# Patient Record
Sex: FEMALE | Race: WHITE | HISPANIC OR LATINO | Employment: FULL TIME | URBAN - METROPOLITAN AREA
[De-identification: names, ages, dates, MRNs, and addresses within clinical notes are randomized per-mention and may not be internally consistent; named-entity substitution may affect disease eponyms.]

---

## 2022-07-31 ENCOUNTER — APPOINTMENT (OUTPATIENT)
Dept: PREADMISSION TESTING | Facility: HOSPITAL | Age: 22
End: 2022-07-31

## 2022-08-01 ENCOUNTER — PRE-ADMISSION TESTING (OUTPATIENT)
Dept: PREADMISSION TESTING | Facility: HOSPITAL | Age: 22
End: 2022-08-01

## 2022-08-01 ENCOUNTER — ANESTHESIA EVENT (OUTPATIENT)
Dept: PERIOP | Facility: HOSPITAL | Age: 22
End: 2022-08-01

## 2022-08-01 LAB
FLUAV SUBTYP SPEC NAA+PROBE: NOT DETECTED
FLUBV RNA ISLT QL NAA+PROBE: NOT DETECTED
SARS-COV-2 RNA PNL SPEC NAA+PROBE: NOT DETECTED

## 2022-08-01 PROCEDURE — C9803 HOPD COVID-19 SPEC COLLECT: HCPCS

## 2022-08-01 PROCEDURE — 87636 SARSCOV2 & INF A&B AMP PRB: CPT

## 2022-08-01 RX ORDER — FAMOTIDINE 10 MG/ML
20 INJECTION, SOLUTION INTRAVENOUS ONCE
Status: CANCELLED | OUTPATIENT
Start: 2022-08-01 | End: 2022-08-01

## 2022-08-02 ENCOUNTER — ANESTHESIA EVENT CONVERTED (OUTPATIENT)
Dept: ANESTHESIOLOGY | Facility: HOSPITAL | Age: 22
End: 2022-08-02

## 2022-08-02 ENCOUNTER — ANESTHESIA (OUTPATIENT)
Dept: PERIOP | Facility: HOSPITAL | Age: 22
End: 2022-08-02

## 2022-08-02 ENCOUNTER — HOSPITAL ENCOUNTER (OUTPATIENT)
Facility: HOSPITAL | Age: 22
Discharge: HOME | End: 2022-08-03
Attending: ORTHOPAEDIC SURGERY | Admitting: ORTHOPAEDIC SURGERY

## 2022-08-02 DIAGNOSIS — M24.111 SNAPPING SCAPULA SYNDROME OF RIGHT SHOULDER: Primary | ICD-10-CM

## 2022-08-02 LAB
B-HCG UR QL: NEGATIVE
EXPIRATION DATE: NORMAL
INTERNAL NEGATIVE CONTROL: NEGATIVE
INTERNAL POSITIVE CONTROL: POSITIVE
Lab: NORMAL

## 2022-08-02 PROCEDURE — 25010000002 DROPERIDOL PER 5 MG

## 2022-08-02 PROCEDURE — 25010000002 FENTANYL CITRATE (PF) 50 MCG/ML SOLUTION: Performed by: ANESTHESIOLOGY

## 2022-08-02 PROCEDURE — 25010000002 DEXAMETHASONE SODIUM PHOSPHATE 10 MG/ML SOLUTION: Performed by: ORTHOPAEDIC SURGERY

## 2022-08-02 PROCEDURE — 25010000002 EPINEPHRINE PER 0.1 MG: Performed by: ORTHOPAEDIC SURGERY

## 2022-08-02 PROCEDURE — 25010000002 HYDROMORPHONE PER 4 MG: Performed by: ORTHOPAEDIC SURGERY

## 2022-08-02 PROCEDURE — 25010000002 PROCHLORPERAZINE 10 MG/2ML SOLUTION: Performed by: ORTHOPAEDIC SURGERY

## 2022-08-02 PROCEDURE — 25010000002 MIDAZOLAM PER 1 MG: Performed by: ORTHOPAEDIC SURGERY

## 2022-08-02 PROCEDURE — 25010000002 ONDANSETRON PER 1 MG

## 2022-08-02 PROCEDURE — 25010000002 ONDANSETRON PER 1 MG: Performed by: ORTHOPAEDIC SURGERY

## 2022-08-02 PROCEDURE — 25010000002 ONDANSETRON PER 1 MG: Performed by: NURSE ANESTHETIST, CERTIFIED REGISTERED

## 2022-08-02 PROCEDURE — 81025 URINE PREGNANCY TEST: CPT | Performed by: ANESTHESIOLOGY

## 2022-08-02 PROCEDURE — 25010000002 FENTANYL CITRATE (PF) 50 MCG/ML SOLUTION

## 2022-08-02 PROCEDURE — 76942 ECHO GUIDE FOR BIOPSY: CPT | Performed by: ORTHOPAEDIC SURGERY

## 2022-08-02 PROCEDURE — 0 MEPERIDINE PER 100 MG

## 2022-08-02 PROCEDURE — 25010000002 PROPOFOL 10 MG/ML EMULSION: Performed by: NURSE ANESTHETIST, CERTIFIED REGISTERED

## 2022-08-02 PROCEDURE — 25010000002 BUPRENORPHINE PER 0.1 MG: Performed by: ORTHOPAEDIC SURGERY

## 2022-08-02 PROCEDURE — 63710000001 PROMETHAZINE PER 12.5 MG: Performed by: ORTHOPAEDIC SURGERY

## 2022-08-02 PROCEDURE — 25010000002 FENTANYL CITRATE (PF) 50 MCG/ML SOLUTION: Performed by: ORTHOPAEDIC SURGERY

## 2022-08-02 RX ORDER — LABETALOL HYDROCHLORIDE 5 MG/ML
5 INJECTION, SOLUTION INTRAVENOUS
Status: DISCONTINUED | OUTPATIENT
Start: 2022-08-02 | End: 2022-08-02 | Stop reason: HOSPADM

## 2022-08-02 RX ORDER — ACETAMINOPHEN 325 MG/1
650 TABLET ORAL ONCE AS NEEDED
Status: DISCONTINUED | OUTPATIENT
Start: 2022-08-02 | End: 2022-08-02 | Stop reason: HOSPADM

## 2022-08-02 RX ORDER — MIDAZOLAM HYDROCHLORIDE 1 MG/ML
INJECTION INTRAMUSCULAR; INTRAVENOUS
Status: COMPLETED | OUTPATIENT
Start: 2022-08-02 | End: 2022-08-02

## 2022-08-02 RX ORDER — SODIUM CHLORIDE 0.9 % (FLUSH) 0.9 %
10 SYRINGE (ML) INJECTION EVERY 12 HOURS SCHEDULED
Status: DISCONTINUED | OUTPATIENT
Start: 2022-08-02 | End: 2022-08-02 | Stop reason: HOSPADM

## 2022-08-02 RX ORDER — CEFAZOLIN SODIUM IN 0.9 % NACL 2 G/100 ML
2 PLASTIC BAG, INJECTION (ML) INTRAVENOUS ONCE
Status: COMPLETED | OUTPATIENT
Start: 2022-08-02 | End: 2022-08-02

## 2022-08-02 RX ORDER — EPINEPHRINE 1 MG/ML
INJECTION, SOLUTION, CONCENTRATE INTRAVENOUS AS NEEDED
Status: DISCONTINUED | OUTPATIENT
Start: 2022-08-02 | End: 2022-08-02 | Stop reason: HOSPADM

## 2022-08-02 RX ORDER — ALBUTEROL SULFATE 2.5 MG/3ML
2.5 SOLUTION RESPIRATORY (INHALATION) EVERY 4 HOURS PRN
Status: DISCONTINUED | OUTPATIENT
Start: 2022-08-02 | End: 2022-08-03 | Stop reason: HOSPADM

## 2022-08-02 RX ORDER — ROCURONIUM BROMIDE 10 MG/ML
INJECTION, SOLUTION INTRAVENOUS AS NEEDED
Status: DISCONTINUED | OUTPATIENT
Start: 2022-08-02 | End: 2022-08-02 | Stop reason: SURG

## 2022-08-02 RX ORDER — BUPIVACAINE HCL/0.9 % NACL/PF 0.125 %
PLASTIC BAG, INJECTION (ML) EPIDURAL AS NEEDED
Status: DISCONTINUED | OUTPATIENT
Start: 2022-08-02 | End: 2022-08-02 | Stop reason: SURG

## 2022-08-02 RX ORDER — DROPERIDOL 2.5 MG/ML
INJECTION, SOLUTION INTRAMUSCULAR; INTRAVENOUS
Status: COMPLETED
Start: 2022-08-02 | End: 2022-08-02

## 2022-08-02 RX ORDER — ONDANSETRON 2 MG/ML
INJECTION INTRAMUSCULAR; INTRAVENOUS
Status: COMPLETED
Start: 2022-08-02 | End: 2022-08-02

## 2022-08-02 RX ORDER — FENTANYL CITRATE 50 UG/ML
50 INJECTION, SOLUTION INTRAMUSCULAR; INTRAVENOUS
Status: DISCONTINUED | OUTPATIENT
Start: 2022-08-02 | End: 2022-08-02 | Stop reason: HOSPADM

## 2022-08-02 RX ORDER — DEXAMETHASONE SODIUM PHOSPHATE 10 MG/ML
INJECTION, SOLUTION INTRAMUSCULAR; INTRAVENOUS
Status: COMPLETED | OUTPATIENT
Start: 2022-08-02 | End: 2022-08-02

## 2022-08-02 RX ORDER — ONDANSETRON 4 MG/1
4 TABLET, FILM COATED ORAL EVERY 8 HOURS PRN
Qty: 10 TABLET | Refills: 0 | Status: SHIPPED | OUTPATIENT
Start: 2022-08-02

## 2022-08-02 RX ORDER — ONDANSETRON 2 MG/ML
INJECTION INTRAMUSCULAR; INTRAVENOUS AS NEEDED
Status: DISCONTINUED | OUTPATIENT
Start: 2022-08-02 | End: 2022-08-02 | Stop reason: SURG

## 2022-08-02 RX ORDER — MELOXICAM 7.5 MG/1
15 TABLET ORAL DAILY
Status: DISCONTINUED | OUTPATIENT
Start: 2022-08-02 | End: 2022-08-03 | Stop reason: HOSPADM

## 2022-08-02 RX ORDER — BUPRENORPHINE HYDROCHLORIDE 0.32 MG/ML
INJECTION INTRAMUSCULAR; INTRAVENOUS
Status: COMPLETED | OUTPATIENT
Start: 2022-08-02 | End: 2022-08-02

## 2022-08-02 RX ORDER — PROCHLORPERAZINE EDISYLATE 5 MG/ML
5 INJECTION INTRAMUSCULAR; INTRAVENOUS EVERY 6 HOURS PRN
Status: DISCONTINUED | OUTPATIENT
Start: 2022-08-02 | End: 2022-08-03 | Stop reason: HOSPADM

## 2022-08-02 RX ORDER — DOCUSATE SODIUM 100 MG/1
100 CAPSULE, LIQUID FILLED ORAL 2 TIMES DAILY
Status: DISCONTINUED | OUTPATIENT
Start: 2022-08-02 | End: 2022-08-03 | Stop reason: HOSPADM

## 2022-08-02 RX ORDER — MEPERIDINE HYDROCHLORIDE 25 MG/ML
INJECTION INTRAMUSCULAR; INTRAVENOUS; SUBCUTANEOUS
Status: COMPLETED
Start: 2022-08-02 | End: 2022-08-02

## 2022-08-02 RX ORDER — ONDANSETRON 2 MG/ML
4 INJECTION INTRAMUSCULAR; INTRAVENOUS ONCE AS NEEDED
Status: COMPLETED | OUTPATIENT
Start: 2022-08-02 | End: 2022-08-02

## 2022-08-02 RX ORDER — ALBUTEROL SULFATE 90 UG/1
2 AEROSOL, METERED RESPIRATORY (INHALATION) EVERY 4 HOURS PRN
COMMUNITY

## 2022-08-02 RX ORDER — HYDROMORPHONE HYDROCHLORIDE 1 MG/ML
0.5 INJECTION, SOLUTION INTRAMUSCULAR; INTRAVENOUS; SUBCUTANEOUS
Status: DISCONTINUED | OUTPATIENT
Start: 2022-08-02 | End: 2022-08-02 | Stop reason: HOSPADM

## 2022-08-02 RX ORDER — ACETAMINOPHEN 500 MG
1000 TABLET ORAL EVERY 6 HOURS PRN
COMMUNITY
End: 2022-08-03 | Stop reason: HOSPADM

## 2022-08-02 RX ORDER — LIDOCAINE HYDROCHLORIDE 10 MG/ML
INJECTION, SOLUTION EPIDURAL; INFILTRATION; INTRACAUDAL; PERINEURAL AS NEEDED
Status: DISCONTINUED | OUTPATIENT
Start: 2022-08-02 | End: 2022-08-02 | Stop reason: SURG

## 2022-08-02 RX ORDER — ESCITALOPRAM OXALATE 10 MG/1
10 TABLET ORAL DAILY
COMMUNITY

## 2022-08-02 RX ORDER — PROPOFOL 10 MG/ML
VIAL (ML) INTRAVENOUS AS NEEDED
Status: DISCONTINUED | OUTPATIENT
Start: 2022-08-02 | End: 2022-08-02 | Stop reason: SURG

## 2022-08-02 RX ORDER — HYDROCODONE BITARTRATE AND ACETAMINOPHEN 5; 325 MG/1; MG/1
1-2 TABLET ORAL EVERY 4 HOURS PRN
Qty: 15 TABLET | Refills: 0 | Status: SHIPPED | OUTPATIENT
Start: 2022-08-02

## 2022-08-02 RX ORDER — SODIUM CHLORIDE, SODIUM LACTATE, POTASSIUM CHLORIDE, CALCIUM CHLORIDE 600; 310; 30; 20 MG/100ML; MG/100ML; MG/100ML; MG/100ML
9 INJECTION, SOLUTION INTRAVENOUS CONTINUOUS
Status: DISCONTINUED | OUTPATIENT
Start: 2022-08-02 | End: 2022-08-03 | Stop reason: HOSPADM

## 2022-08-02 RX ORDER — OXYCODONE HYDROCHLORIDE 5 MG/1
5 TABLET ORAL ONCE AS NEEDED
Status: DISCONTINUED | OUTPATIENT
Start: 2022-08-02 | End: 2022-08-02 | Stop reason: HOSPADM

## 2022-08-02 RX ORDER — FAMOTIDINE 20 MG/1
20 TABLET, FILM COATED ORAL ONCE
Status: COMPLETED | OUTPATIENT
Start: 2022-08-02 | End: 2022-08-02

## 2022-08-02 RX ORDER — BUPIVACAINE HYDROCHLORIDE 2.5 MG/ML
INJECTION, SOLUTION EPIDURAL; INFILTRATION; INTRACAUDAL
Status: COMPLETED | OUTPATIENT
Start: 2022-08-02 | End: 2022-08-02

## 2022-08-02 RX ORDER — SODIUM CHLORIDE 0.9 % (FLUSH) 0.9 %
10 SYRINGE (ML) INJECTION AS NEEDED
Status: DISCONTINUED | OUTPATIENT
Start: 2022-08-02 | End: 2022-08-02 | Stop reason: HOSPADM

## 2022-08-02 RX ORDER — FENTANYL CITRATE 50 UG/ML
INJECTION, SOLUTION INTRAMUSCULAR; INTRAVENOUS
Status: COMPLETED | OUTPATIENT
Start: 2022-08-02 | End: 2022-08-02

## 2022-08-02 RX ORDER — FENTANYL CITRATE 50 UG/ML
INJECTION, SOLUTION INTRAMUSCULAR; INTRAVENOUS
Status: COMPLETED
Start: 2022-08-02 | End: 2022-08-02

## 2022-08-02 RX ORDER — EPHEDRINE SULFATE 50 MG/ML
5 INJECTION, SOLUTION INTRAVENOUS ONCE AS NEEDED
Status: DISCONTINUED | OUTPATIENT
Start: 2022-08-02 | End: 2022-08-02 | Stop reason: HOSPADM

## 2022-08-02 RX ORDER — NALOXONE HCL 0.4 MG/ML
0.1 VIAL (ML) INJECTION
Status: DISCONTINUED | OUTPATIENT
Start: 2022-08-02 | End: 2022-08-03 | Stop reason: HOSPADM

## 2022-08-02 RX ORDER — ONDANSETRON 2 MG/ML
4 INJECTION INTRAMUSCULAR; INTRAVENOUS EVERY 6 HOURS PRN
Status: DISCONTINUED | OUTPATIENT
Start: 2022-08-02 | End: 2022-08-03 | Stop reason: HOSPADM

## 2022-08-02 RX ORDER — ACETAMINOPHEN 325 MG/1
325 TABLET ORAL EVERY 6 HOURS
Status: DISCONTINUED | OUTPATIENT
Start: 2022-08-02 | End: 2022-08-03 | Stop reason: HOSPADM

## 2022-08-02 RX ORDER — HYDROMORPHONE HYDROCHLORIDE 1 MG/ML
0.5 INJECTION, SOLUTION INTRAMUSCULAR; INTRAVENOUS; SUBCUTANEOUS
Status: DISCONTINUED | OUTPATIENT
Start: 2022-08-02 | End: 2022-08-03 | Stop reason: HOSPADM

## 2022-08-02 RX ORDER — PROMETHAZINE HYDROCHLORIDE 12.5 MG/1
12.5 SUPPOSITORY RECTAL EVERY 6 HOURS PRN
Status: DISCONTINUED | OUTPATIENT
Start: 2022-08-02 | End: 2022-08-03 | Stop reason: HOSPADM

## 2022-08-02 RX ORDER — FAMOTIDINE 20 MG/1
40 TABLET, FILM COATED ORAL DAILY
Status: DISCONTINUED | OUTPATIENT
Start: 2022-08-03 | End: 2022-08-03 | Stop reason: HOSPADM

## 2022-08-02 RX ORDER — SODIUM CHLORIDE, SODIUM LACTATE, POTASSIUM CHLORIDE, AND CALCIUM CHLORIDE .6; .31; .03; .02 G/100ML; G/100ML; G/100ML; G/100ML
IRRIGANT IRRIGATION AS NEEDED
Status: DISCONTINUED | OUTPATIENT
Start: 2022-08-02 | End: 2022-08-02 | Stop reason: HOSPADM

## 2022-08-02 RX ORDER — ONDANSETRON 4 MG/1
4 TABLET, FILM COATED ORAL EVERY 6 HOURS PRN
Status: DISCONTINUED | OUTPATIENT
Start: 2022-08-02 | End: 2022-08-03 | Stop reason: HOSPADM

## 2022-08-02 RX ORDER — HYDROCODONE BITARTRATE AND ACETAMINOPHEN 5; 325 MG/1; MG/1
1 TABLET ORAL EVERY 4 HOURS PRN
Status: DISCONTINUED | OUTPATIENT
Start: 2022-08-02 | End: 2022-08-03 | Stop reason: HOSPADM

## 2022-08-02 RX ORDER — ESCITALOPRAM OXALATE 10 MG/1
10 TABLET ORAL DAILY
Status: DISCONTINUED | OUTPATIENT
Start: 2022-08-03 | End: 2022-08-03 | Stop reason: HOSPADM

## 2022-08-02 RX ORDER — MEPERIDINE HYDROCHLORIDE 25 MG/ML
12.5 INJECTION INTRAMUSCULAR; INTRAVENOUS; SUBCUTANEOUS ONCE
Status: COMPLETED | OUTPATIENT
Start: 2022-08-02 | End: 2022-08-02

## 2022-08-02 RX ORDER — PROCHLORPERAZINE MALEATE 5 MG/1
5 TABLET ORAL EVERY 6 HOURS PRN
Status: DISCONTINUED | OUTPATIENT
Start: 2022-08-02 | End: 2022-08-02

## 2022-08-02 RX ORDER — ACETAMINOPHEN 650 MG/1
650 SUPPOSITORY RECTAL ONCE AS NEEDED
Status: DISCONTINUED | OUTPATIENT
Start: 2022-08-02 | End: 2022-08-02 | Stop reason: HOSPADM

## 2022-08-02 RX ORDER — DOCUSATE SODIUM 100 MG/1
100 CAPSULE, LIQUID FILLED ORAL 2 TIMES DAILY
Qty: 10 CAPSULE | Refills: 0 | Status: SHIPPED | OUTPATIENT
Start: 2022-08-02

## 2022-08-02 RX ORDER — MIDAZOLAM HYDROCHLORIDE 1 MG/ML
1 INJECTION INTRAMUSCULAR; INTRAVENOUS
Status: DISCONTINUED | OUTPATIENT
Start: 2022-08-02 | End: 2022-08-02 | Stop reason: HOSPADM

## 2022-08-02 RX ORDER — PROCHLORPERAZINE EDISYLATE 5 MG/ML
5 INJECTION INTRAMUSCULAR; INTRAVENOUS EVERY 6 HOURS PRN
Status: DISCONTINUED | OUTPATIENT
Start: 2022-08-02 | End: 2022-08-02

## 2022-08-02 RX ORDER — DROPERIDOL 2.5 MG/ML
0.62 INJECTION, SOLUTION INTRAMUSCULAR; INTRAVENOUS ONCE
Status: COMPLETED | OUTPATIENT
Start: 2022-08-02 | End: 2022-08-02

## 2022-08-02 RX ORDER — LIDOCAINE HYDROCHLORIDE 10 MG/ML
0.5 INJECTION, SOLUTION EPIDURAL; INFILTRATION; INTRACAUDAL; PERINEURAL ONCE AS NEEDED
Status: COMPLETED | OUTPATIENT
Start: 2022-08-02 | End: 2022-08-02

## 2022-08-02 RX ORDER — PROMETHAZINE HYDROCHLORIDE 12.5 MG/1
12.5 TABLET ORAL EVERY 6 HOURS PRN
Status: DISCONTINUED | OUTPATIENT
Start: 2022-08-02 | End: 2022-08-03 | Stop reason: HOSPADM

## 2022-08-02 RX ADMIN — CEFAZOLIN 2 G: 10 INJECTION, POWDER, FOR SOLUTION INTRAVENOUS at 08:03

## 2022-08-02 RX ADMIN — FENTANYL CITRATE 50 MCG: 50 INJECTION, SOLUTION INTRAMUSCULAR; INTRAVENOUS at 09:32

## 2022-08-02 RX ADMIN — ONDANSETRON 4 MG: 2 INJECTION INTRAMUSCULAR; INTRAVENOUS at 08:37

## 2022-08-02 RX ADMIN — MIDAZOLAM HYDROCHLORIDE 2 MG: 1 INJECTION, SOLUTION INTRAMUSCULAR; INTRAVENOUS at 07:28

## 2022-08-02 RX ADMIN — DEXAMETHASONE SODIUM PHOSPHATE 6 MG: 10 INJECTION, SOLUTION INTRAMUSCULAR; INTRAVENOUS at 07:56

## 2022-08-02 RX ADMIN — MEPERIDINE HYDROCHLORIDE 12.5 MG: 25 INJECTION INTRAMUSCULAR; INTRAVENOUS; SUBCUTANEOUS at 09:57

## 2022-08-02 RX ADMIN — PROMETHAZINE HYDROCHLORIDE 12.5 MG: 12.5 TABLET ORAL at 16:23

## 2022-08-02 RX ADMIN — BUPIVACAINE HYDROCHLORIDE 5 ML: 2.5 INJECTION, SOLUTION EPIDURAL; INFILTRATION; INTRACAUDAL; PERINEURAL at 07:28

## 2022-08-02 RX ADMIN — SODIUM CHLORIDE, POTASSIUM CHLORIDE, SODIUM LACTATE AND CALCIUM CHLORIDE: 600; 310; 30; 20 INJECTION, SOLUTION INTRAVENOUS at 08:56

## 2022-08-02 RX ADMIN — Medication 100 MCG: at 08:25

## 2022-08-02 RX ADMIN — SUGAMMADEX 200 MG: 100 INJECTION, SOLUTION INTRAVENOUS at 09:04

## 2022-08-02 RX ADMIN — PROCHLORPERAZINE EDISYLATE 5 MG: 5 INJECTION INTRAMUSCULAR; INTRAVENOUS at 19:01

## 2022-08-02 RX ADMIN — FENTANYL CITRATE 50 MCG: 50 INJECTION, SOLUTION INTRAMUSCULAR; INTRAVENOUS at 09:40

## 2022-08-02 RX ADMIN — HYDROMORPHONE HYDROCHLORIDE 0.5 MG: 1 INJECTION, SOLUTION INTRAMUSCULAR; INTRAVENOUS; SUBCUTANEOUS at 16:49

## 2022-08-02 RX ADMIN — Medication 100 MCG: at 08:19

## 2022-08-02 RX ADMIN — ONDANSETRON 4 MG: 2 INJECTION INTRAMUSCULAR; INTRAVENOUS at 09:31

## 2022-08-02 RX ADMIN — PROPOFOL 25 MCG/KG/MIN: 10 INJECTION, EMULSION INTRAVENOUS at 08:05

## 2022-08-02 RX ADMIN — ACETAMINOPHEN 325 MG: 325 TABLET ORAL at 19:56

## 2022-08-02 RX ADMIN — FENTANYL CITRATE 100 MCG: 50 INJECTION, SOLUTION INTRAMUSCULAR; INTRAVENOUS at 07:28

## 2022-08-02 RX ADMIN — DROPERIDOL 0.62 MG: 2.5 INJECTION, SOLUTION INTRAMUSCULAR; INTRAVENOUS at 09:53

## 2022-08-02 RX ADMIN — ROCURONIUM BROMIDE 50 MG: 10 INJECTION, SOLUTION INTRAVENOUS at 08:05

## 2022-08-02 RX ADMIN — DEXAMETHASONE SODIUM PHOSPHATE 2 MG: 10 INJECTION, SOLUTION INTRAMUSCULAR; INTRAVENOUS at 07:46

## 2022-08-02 RX ADMIN — SODIUM CHLORIDE, POTASSIUM CHLORIDE, SODIUM LACTATE AND CALCIUM CHLORIDE 9 ML/HR: 600; 310; 30; 20 INJECTION, SOLUTION INTRAVENOUS at 06:44

## 2022-08-02 RX ADMIN — LIDOCAINE HYDROCHLORIDE 50 MG: 10 INJECTION, SOLUTION EPIDURAL; INFILTRATION; INTRACAUDAL; PERINEURAL at 07:56

## 2022-08-02 RX ADMIN — DOCUSATE SODIUM 100 MG: 100 CAPSULE, LIQUID FILLED ORAL at 19:56

## 2022-08-02 RX ADMIN — LIDOCAINE HYDROCHLORIDE 0.5 ML: 10 INJECTION, SOLUTION EPIDURAL; INFILTRATION; INTRACAUDAL; PERINEURAL at 06:44

## 2022-08-02 RX ADMIN — MELOXICAM 15 MG: 7.5 TABLET ORAL at 19:56

## 2022-08-02 RX ADMIN — PROPOFOL 200 MG: 10 INJECTION, EMULSION INTRAVENOUS at 07:56

## 2022-08-02 RX ADMIN — ONDANSETRON 4 MG: 2 INJECTION INTRAMUSCULAR; INTRAVENOUS at 11:33

## 2022-08-02 RX ADMIN — FAMOTIDINE 20 MG: 20 TABLET ORAL at 06:47

## 2022-08-02 RX ADMIN — ONDANSETRON 4 MG: 2 INJECTION INTRAMUSCULAR; INTRAVENOUS at 16:48

## 2022-08-02 RX ADMIN — BUPRENORPHINE HYDROCHLORIDE 0.15 MG: 0.32 INJECTION INTRAMUSCULAR; INTRAVENOUS at 07:28

## 2022-08-02 RX ADMIN — BUPRENORPHINE HYDROCHLORIDE 0.15 MG: 0.32 INJECTION INTRAMUSCULAR; INTRAVENOUS at 07:46

## 2022-08-02 RX ADMIN — DEXAMETHASONE SODIUM PHOSPHATE 2 MG: 10 INJECTION, SOLUTION INTRAMUSCULAR; INTRAVENOUS at 07:28

## 2022-08-02 RX ADMIN — BUPIVACAINE HYDROCHLORIDE 25 ML: 2.5 INJECTION, SOLUTION EPIDURAL; INFILTRATION; INTRACAUDAL at 07:46

## 2022-08-02 NOTE — ANESTHESIA PROCEDURE NOTES
Airway  Urgency: elective    Date/Time: 8/2/2022 7:57 AM  Airway not difficult    General Information and Staff    Patient location during procedure: OR  SRNA: Shelly Pereira SRNA  Indications and Patient Condition  Indications for airway management: airway protection    Preoxygenated: yes  MILS not maintained throughout  Mask difficulty assessment: 1 - vent by mask    Final Airway Details  Final airway type: endotracheal airway      Successful airway: ETT  Cuffed: yes   Successful intubation technique: direct laryngoscopy  Endotracheal tube insertion site: oral  Blade: Olivia  Blade size: 3  ETT size (mm): 7.0  Cormack-Lehane Classification: grade I - full view of glottis  Placement verified by: chest auscultation and capnometry   Measured from: lips  ETT/EBT  to lips (cm): 21  Number of attempts at approach: 1  Assessment: lips, teeth, and gum same as pre-op and atraumatic intubation    Additional Comments  Negative epigastric sounds, Breath sound equal bilaterally with symmetric chest rise and fall

## 2022-08-02 NOTE — ANESTHESIA POSTPROCEDURE EVALUATION
Patient: Juanis Zambrano    Procedure Summary     Date: 08/02/22 Room / Location:  [facility] [address] [place] [address] [facility]    Anesthesia Start: 0753 Anesthesia Stop: 0925    Procedure: SCAPULOTHORACIC BURSECTOMY (Right Shoulder) Diagnosis:     Surgeons: Matthew Farah Jr., MD Provider: Jose Aggarwal Jr., MD    Anesthesia Type: general with block ASA Status: 2          Anesthesia Type: general with block    Vitals  Vitals Value Taken Time   /60 08/02/22 0921   Temp     Pulse 78 08/02/22 0925   Resp     SpO2 100 % 08/02/22 0925   Vitals shown include unvalidated device data.        Post Anesthesia Care and Evaluation    Patient location during evaluation: PACU  Patient participation: complete - patient participated  Level of consciousness: sleepy but conscious  Pain score: 0  Pain management: adequate    Airway patency: patent  Anesthetic complications: No anesthetic complications  PONV Status: none  Cardiovascular status: hemodynamically stable and acceptable  Respiratory status: nonlabored ventilation, acceptable, nasal cannula and oral airway  Hydration status: acceptable

## 2022-08-02 NOTE — H&P
"  Pre-Op H&P  Juanis Zambrano  [id number]  2000      Chief complaint: Right shoulder pain      Subjective:  Patient is a 21 y.o.female presents for scheduled surgery by Dr. Farah. She anticipates a SCAPULOTHORACIC BURSECTOMY today. Her shoulder has been painful for over a year. She reports popping and clicking sounds with movement. She denies numbness, tingling or difficulty with  right hand.      Review of Systems:  Constitutional-- No fever, chills or sweats. No fatigue.  CV-- No chest pain, palpitation or syncope  Resp-- No SOB, cough, hemoptysis  Skin--No rashes or lesions      Allergies: No Known Allergies      Home Meds:  Medications Prior to Admission   Medication Sig Dispense Refill Last Dose   • acetaminophen (TYLENOL) 500 MG tablet Take 1,000 mg by mouth Every 6 (Six) Hours As Needed for Mild Pain .   Past Week at Unknown time   • albuterol sulfate  (90 Base) MCG/ACT inhaler Inhale 2 puffs Every 4 (Four) Hours As Needed for Wheezing.   Past Month at Unknown time   • escitalopram (LEXAPRO) 10 MG tablet Take 10 mg by mouth Daily.   Past Week at Unknown time         PMH:   Past Medical History:   Diagnosis Date   • Asthma      PSH:    Past Surgical History:   Procedure Laterality Date   • WISDOM TOOTH EXTRACTION         Immunization History:  Influenza: No  Pneumococcal: No  Tetanus: UTD  Covid : No    Social History:   Tobacco:   Social History     Tobacco Use   Smoking Status Never Smoker   Smokeless Tobacco Not on file      Alcohol:     Social History     Substance and Sexual Activity   Alcohol Use Never         Physical Exam:/80 (BP Location: Left arm, Patient Position: Lying)   Pulse 101   Temp 97.4 °F (36.3 °C) (Temporal)   Resp 18   Ht 162.6 cm (64\")   Wt 51.7 kg (114 lb)   LMP 07/28/2022 (Approximate)   SpO2 100%   BMI 19.57 kg/m²       General Appearance:    Alert, cooperative, no distress, appears stated age   Head:    Normocephalic, without obvious " abnormality, atraumatic   Lungs:     Clear to auscultation bilaterally, respirations unlabored    Heart:   Regular rate and rhythm, S1 and S2 normal    Abdomen:    Soft without tenderness   Extremities:   Extremities normal, atraumatic, no cyanosis or edema   Skin:   Skin color, texture, turgor normal, no rashes or lesions   Neurologic:   Grossly intact     Results Review:     LABS:  No results found for: WBC, HGB, HCT, MCV, PLT, NEUTROABS, GLUCOSE, BUN, CREATININE, EGFRIFNONA, EGFRIFAFRI, NA, K, CL, CO2, MG, PHOS, CALCIUM, ALBUMIN, AST, ALT, BILITOT    RADIOLOGY:  Imaging Results (Last 72 Hours)     ** No results found for the last 72 hours. **          I reviewed the patient's new clinical results.    Cancer Staging (if applicable)  Cancer Patient: __ yes __no __unknown; If yes, clinical stage T:__ N:__M:__, stage group or __N/A      Impression: Right shoulder pain      Plan: SCAPULOTHORACIC BURSECTOMY      HAROON Posey   8/2/2022   06:54 EDT

## 2022-08-02 NOTE — PERIOPERATIVE NURSING NOTE
Pt ambulated to bathroom with assist of one, pt with dizziness, NV and diaphoretic. Pt back to bed per WC.

## 2022-08-02 NOTE — ANESTHESIA PROCEDURE NOTES
Erector Spinae Block      Performed by  Anesthesiologist: Jose Aggarwal Jr., MD  CRNA/CAA: China Fuentes CRNA  Assisted by: Patricia Gracia RN  Preanesthetic Checklist  Completed: patient identified, IV checked, site marked, risks and benefits discussed, surgical consent, monitors and equipment checked, pre-op evaluation and timeout performed  Prep:  Pt Position: sitting  Sterile barriers:cap, gloves and sterile barriers  Prep: ChloraPrep  Patient monitoring: blood pressure monitoring, continuous pulse oximetry and EKG  Procedure    Sedation: yes  Performed under: local infiltration  Guidance:ultrasound guided    ULTRASOUND INTERPRETATION. Using ultrasound guidance a 20 G gauge needle was placed in close proximity to the nerve, at which point, under ultrasound guidance anesthetic was injected in the area of the nerve and spread of the anesthesia was seen on ultrasound in close proximity thereto.  There were no abnormalities seen on ultrasound; a digital image was taken; and the patient tolerated the procedure with no complications. Images:still images obtained, printed/placed on chart    Laterality:right  Block Type:erector spinae block  Injection Technique:single-shot  Needle Type:echogenic and short-bevel  Needle Gauge:20 G      Medications Used: buprenorphine (BUPRENEX) injection, 0.15 mg  dexamethasone sodium phosphate injection, 2 mg  bupivacaine PF (MARCAINE) 0.25 % injection, 25 mL  Med administered at 8/2/2022 7:46 AM      Medications  Comment:Total of 30 ml, Decadron PF 2 mg, Buprenorphine .3 mg mixture. 25 ml used for NADIYA block      Post Assessment  Injection Assessment: negative aspiration for heme, no paresthesia on injection and incremental injection  Patient Tolerance:comfortable throughout block  Complications:no

## 2022-08-02 NOTE — OP NOTE
SCAPULOTHORACIC ARTHROSCOPY  Procedure Report    Patient Name:  Juanis Zambrano  YOB: 2000    Date of Surgery:  8/2/2022     Indications:  20 yo female with right posterior shoulder pain with snapping scapula that has not responded to PT, CSI and activity modifications. Given her lack of improvements with conservative management, we discussed the risk and benefits of R ASC scapulothoracic bursectomy. The risks and benefits were discussed with the patient to include, but not limited to: bleeding, infection, nerve injury, failure of fixation, need for further procedures, loss of limb or life.  She understood the risks and wished to proceed with procedure.    Pre-op Diagnosis:        R snapping scapula      Post-op Diagnosis:         same    Procedure/CPT® Codes:      Procedure(s):  SCAPULOTHORACIC BURSECTOMY, arthroscopic    Staff:  Surgeon(s):  Matthew Farah Jr., MD    Circulator: Alexus Carlton RN  Scrub Person: Peña Palacios  Nursing Assistant: Soledad Washburn PCT  Assistant: Isabela Cueva PA-C     Assistant: Isabela Cueva PA-C  was responsible for performing the following activities: Retraction, Suturing, Closing and Placing Dressing and their skilled assistance was necessary for the success of this case.    Anesthesia: General with Block    Estimated Blood Loss: 30 mL    Implants:    Nothing was implanted during the procedure    Specimen:                None      Findings: scar band along superomedial border of scapula without any bony abnormalities noted    Complications: none apparent.    Description of Procedure: After informed consent had been obtained, the right upper extremity was identified as the correct surgical extremity and marked. The patient then received a pre-operative peripheral nerve block. The patient was then taken back to the operating suite and was placed on the operating table. General anesthesia was induced and the patient was intubated. The patient  was placed in the prone position, padding all bony prominences. The surgical extremity was then prepped and draped in the usual, sterile fashion.  A timeout was performed with entire surgical staff present.    We then began the procedure by making a portal approximately 4 cm distal and 3 cm medial to the scapular spine.  Blunt dissection was carried out and the trocar for the arthroscope was introduced into the scapulothoracic bursa.  Using triangulation, we then created a portal through an outside in technique approximately 3 cm medial to the scapular spine and in line with the scapular spine.  Blunt dissection was carried out to create a space within the scapulothoracic bursa.  We then used a combination of an arthroscopic shaver and radiofrequency ablator to perform a bursectomy.  A thickened scar band was then identified along the superior medial border of the scapula just proximal to the scapular spine.  This was excised with an arthroscopic shaver and wand, taking care to avoid injury to the vessels superiorly.  We then excised the bursa laterally to the level of the suprascapular nerve which was protected throughout the case.  After adequate excision of the bursa and scar band, the wound was irrigated and all instruments and arthroscope were removed.  The portal sites were closed with 3-0 nylon suture.  Sterile bulky dressing was then applied. The patient was then awakened from anesthesia, extubated, transferred to the \[facility]\"", and transferred to the post anesthesia care unit in stable condition.    The plan for Juanis is to remain weightbearing and range of motion as tolerated to the right upper extremity.  She will follow-up in 2 weeks for wound check and begin physical therapy working on range of motion and scapulothoracic mechanics.        Matthew Farah Jr., MD     Date: 8/2/2022  Time: 09:08 EDT

## 2022-08-02 NOTE — ANESTHESIA PREPROCEDURE EVALUATION
Anesthesia Evaluation     Patient summary reviewed and Nursing notes reviewed   no history of anesthetic complications:  NPO Solid Status: > 8 hours  NPO Liquid Status: > 2 hours           Airway   Mallampati: I  TM distance: >3 FB  Neck ROM: full  No difficulty expected  Dental - normal exam     Pulmonary - normal exam    breath sounds clear to auscultation  (+) asthma (well-controlled),  (-) not a smoker  Cardiovascular - negative cardio ROS and normal exam    (-) murmur      Neuro/Psych  (-) seizures, CVA  GI/Hepatic/Renal/Endo - negative ROS     Musculoskeletal     Abdominal    Substance History      OB/GYN          Other                      Anesthesia Plan    ASA 2     general with block     intravenous induction     Anesthetic plan, risks, benefits, and alternatives have been provided, discussed and informed consent has been obtained with: patient.    Plan discussed with CRNA.        CODE STATUS:

## 2022-08-02 NOTE — PLAN OF CARE
Goal Outcome Evaluation:  Plan of Care Reviewed With: patient, family           Outcome Evaluation: rec'd from PACU, patient with c/o nausea and vomiting, prn zofran and phenergan given, c/o pain iv dilaudid administered, AAOx4, up with assistance to BSC, voiding, dressing to left shoulder CDI, block to RUE intact with decreased sensation, pulses palpable, sling to RUE

## 2022-08-02 NOTE — PERIOPERATIVE NURSING NOTE
1245 pt up to bathroom with assist of one, LOC x 3-5 sec while on toilet, pt pale and diaphoretic. Assist to WC and to bed. VSS. Called Dr Farah, update given request for pt to stay in PACU x2 hrs and call with update.

## 2022-08-02 NOTE — ANESTHESIA PROCEDURE NOTES
Selective Dorsoscapular      Patient reassessed immediately prior to procedure    Patient location during procedure: pre-op  Reason for block: at surgeon's request and post-op pain management  Performed by  CRNA/CAA: China Fuentes CRNA  Assisted by: Patricia Gracia RN  Preanesthetic Checklist  Completed: patient identified, IV checked, site marked, risks and benefits discussed, surgical consent, monitors and equipment checked, pre-op evaluation and timeout performed  Prep:  Pt Position: left lateral decubitus  Sterile barriers:cap, gloves, mask and sterile barriers  Prep: ChloraPrep  Patient monitoring: blood pressure monitoring, continuous pulse oximetry and EKG  Procedure    Sedation: yes  Performed under: local infiltration  Guidance:ultrasound guided  Images:still images obtained, printed/placed on chart    Laterality:right  Anesthesia block type: Dorsoscapular.  Injection Technique:single-shot  Needle Type:echogenic and short-bevel  Needle Gauge:20 G  Resistance on Injection: none  Catheter size: 20g.    Medications Used: midazolam (VERSED) injection, 2 mg  fentaNYL citrate (PF) (SUBLIMAZE) injection, 100 mcg  buprenorphine (BUPRENEX) injection, 0.15 mg  dexamethasone sodium phosphate injection, 2 mg  bupivacaine PF (MARCAINE) 0.25 % injection, 5 mL  Med administered at 8/2/2022 7:28 AM      Medications  Comment:Total of 30 ml, Decadron PF 2 mg, Buprenorphine .3 mg mixture. 5 ml used fopr dorsoscapular, the pther used with NADIYA block    Post Assessment  Injection Assessment: negative aspiration for heme, no paresthesia on injection and incremental injection  Patient Tolerance:comfortable throughout block  Complications:no  Additional Notes  Procedure:                 The pt was placed in semifowlers position with a slight tilt of the thorax contralateral to the insertion site.  The Insertion Site was prepped and draped in sterile fashion.  The pt was anesthetized with  IV Sedation( see meds) and  Skin and  cutaneous tissue was infiltrated and anesthetized with 1% Lidocaine 3 mls via a 25g needle.  Utilizing ultrasound guidance, a BBraun 4 inch 18 g Contiplex echogenic touhy needle was advanced in-plane.  Hydro dissection of tissue was achieved with Normal saline. Major vessels(carotid and Internal Jugular) where visualized as the brachial plexus was approached at the approximate level of C-7/ T-1.  Cervical 5 and Branches of Cervical 6 nerve roots where visualized and the needle tip was placed posterior at the level of C-6 roots.  LA spread was visualized and injection was made incrementally every 5 mls with aspiration. Injection pressure was normal or little, there was no intraneural injection, no vascular injection.      The BBraun 20 g wire stylet catheter was then placed under US guidance on the posterior aspect of the Brachial Plexus. The tuohy was removed and the location of catheter was confirmed with NS injection visualized with US . The skin was sealed with exofin tissue adhesive at catheter insertion site.  Skin was prepped with benzoin and the catheter was secured with steristrips and a CHG tegaderm. Appropriate labels applied. Thank You.

## 2022-08-03 VITALS
DIASTOLIC BLOOD PRESSURE: 65 MMHG | BODY MASS INDEX: 19.46 KG/M2 | TEMPERATURE: 98.1 F | WEIGHT: 114 LBS | SYSTOLIC BLOOD PRESSURE: 97 MMHG | HEART RATE: 86 BPM | OXYGEN SATURATION: 97 % | HEIGHT: 64 IN | RESPIRATION RATE: 16 BRPM

## 2022-08-03 PROCEDURE — 63710000001 ONDANSETRON PER 8 MG: Performed by: ORTHOPAEDIC SURGERY

## 2022-08-03 PROCEDURE — 63710000001 PROMETHAZINE PER 12.5 MG: Performed by: ORTHOPAEDIC SURGERY

## 2022-08-03 PROCEDURE — 97165 OT EVAL LOW COMPLEX 30 MIN: CPT

## 2022-08-03 PROCEDURE — 97535 SELF CARE MNGMENT TRAINING: CPT

## 2022-08-03 RX ORDER — PROMETHAZINE HYDROCHLORIDE 12.5 MG/1
12.5 TABLET ORAL EVERY 6 HOURS PRN
Qty: 15 TABLET | Refills: 0 | Status: SHIPPED | OUTPATIENT
Start: 2022-08-03

## 2022-08-03 RX ORDER — MELOXICAM 7.5 MG/1
15 TABLET ORAL DAILY
Qty: 20 TABLET | Refills: 0 | Status: SHIPPED | OUTPATIENT
Start: 2022-08-03

## 2022-08-03 RX ORDER — SODIUM CHLORIDE 0.9 % (FLUSH) 0.9 %
10 SYRINGE (ML) INJECTION AS NEEDED
Status: DISCONTINUED | OUTPATIENT
Start: 2022-08-03 | End: 2022-08-03 | Stop reason: HOSPADM

## 2022-08-03 RX ORDER — SODIUM CHLORIDE 0.9 % (FLUSH) 0.9 %
10 SYRINGE (ML) INJECTION EVERY 12 HOURS SCHEDULED
Status: DISCONTINUED | OUTPATIENT
Start: 2022-08-03 | End: 2022-08-03 | Stop reason: HOSPADM

## 2022-08-03 RX ORDER — ACETAMINOPHEN 500 MG
500 TABLET ORAL EVERY 6 HOURS PRN
Qty: 40 TABLET | Refills: 0 | Status: SHIPPED | OUTPATIENT
Start: 2022-08-03

## 2022-08-03 RX ADMIN — ONDANSETRON HYDROCHLORIDE 4 MG: 4 TABLET, FILM COATED ORAL at 04:04

## 2022-08-03 RX ADMIN — PROMETHAZINE HYDROCHLORIDE 12.5 MG: 12.5 TABLET ORAL at 08:19

## 2022-08-03 RX ADMIN — ESCITALOPRAM OXALATE 10 MG: 10 TABLET ORAL at 08:19

## 2022-08-03 RX ADMIN — DOCUSATE SODIUM 100 MG: 100 CAPSULE, LIQUID FILLED ORAL at 08:19

## 2022-08-03 RX ADMIN — MELOXICAM 15 MG: 7.5 TABLET ORAL at 08:19

## 2022-08-03 RX ADMIN — HYDROCODONE BITARTRATE AND ACETAMINOPHEN 1 TABLET: 5; 325 TABLET ORAL at 04:04

## 2022-08-03 RX ADMIN — HYDROCODONE BITARTRATE AND ACETAMINOPHEN 1 TABLET: 5; 325 TABLET ORAL at 08:19

## 2022-08-03 RX ADMIN — FAMOTIDINE 40 MG: 20 TABLET ORAL at 08:20

## 2022-08-03 NOTE — PLAN OF CARE
Goal Outcome Evaluation:  Plan of Care Reviewed With: patient      Pt c/o pain in right shoulder, pain medication administered upon request. Mother stayed the night.  Pt is getting up to BSC with assist x 1 and voiding without issues.  VSS, slept most of shift.  Will continue to monitor.

## 2022-08-03 NOTE — DISCHARGE SUMMARY
Date of Discharge:  8/3/2022    Discharge Diagnosis:   Right snapping scapula    Problem List:    Snapping scapula syndrome of right shoulder      Presenting Problem/History of Present Illness  Snapping scapula syndrome of right shoulder [M24.111]    Patient presents on 8/2/2022 for right shoulder scapulothoracic bursectomy for snapping scapula syndrome that has not responded to conservative measures.  She understood the risks and benefits of right arthroscopic scapulothoracic bursectomy. The risks and benefits were discussed with the patient to include, but not limited to: bleeding, infection, nerve injury, need for further procedures, loss of limb or life.  She understood these risks and wished proceed with procedure.      Hospital Course  Patient is a 21 y.o. female presented with right snapping scapula.  She underwent right arthroscopic scapulothoracic bursectomy on 8/2/2022.  Postoperatively she had complications due to intractable nausea that did not respond to initial measures while in the postanesthesia care unit.  Due to this, she was admitted overnight for control of her intractable nausea.  On 8/3/2022, her nausea had significantly improved.  She did continue to have some numbness and tingling throughout the right upper extremity, likely due to her nerve block.  Otherwise, her pain was well controlled with p.o. medications.  She was also tolerating p.o water and crackers without any vomiting for over 12 hours.  It was at this time that we felt patient would be stable for discharge home.      Procedures Performed  Procedure(s):  SCAPULOTHORACIC BURSECTOMY       Consults:   Consults     No orders found for last 30 day(s).          Pertinent Test Results: None    Condition on Discharge: Stable    Vital Signs  Temp:  [97 °F (36.1 °C)-98.8 °F (37.1 °C)] 98.1 °F (36.7 °C)  Heart Rate:  [70-86] 86  Resp:  [16] 16  BP: ()/(55-74) 97/65    Discharge Disposition  Home or Self Care    Discharge Medications      Discharge Medications      New Medications      Instructions Start Date   HYDROcodone-acetaminophen 5-325 MG per tablet  Commonly known as: NORCO   1-2 tablets, Oral, Every 4 Hours PRN      meloxicam 7.5 MG tablet  Commonly known as: Mobic   15 mg, Oral, Daily      ondansetron 4 MG tablet  Commonly known as: Zofran   4 mg, Oral, Every 8 Hours PRN      promethazine 12.5 MG tablet  Commonly known as: PHENERGAN   12.5 mg, Oral, Every 6 Hours PRN      Stool Softener 100 MG capsule  Generic drug: docusate sodium   100 mg, Oral, 2 Times Daily         Changes to Medications      Instructions Start Date   acetaminophen 500 MG tablet  Commonly known as: TYLENOL  What changed: how much to take   500 mg, Oral, Every 6 Hours PRN         Continue These Medications      Instructions Start Date   albuterol sulfate  (90 Base) MCG/ACT inhaler  Commonly known as: PROVENTIL HFA;VENTOLIN HFA;PROAIR HFA   2 puffs, Inhalation, Every 4 Hours PRN      escitalopram 10 MG tablet  Commonly known as: LEXAPRO   10 mg, Oral, Daily             Discharge Diet   Regular    Activity at Discharge  Activity Instructions     Patient May Shower; No Tub Baths, Pools or Hot Tubs      Weight Bearing As Tolerated            Follow-up Appointments  No future appointments.  Additional Instructions for the Follow-ups that You Need to Schedule     Apply Ice to Affected Shoulder Every 2 Hours   As directed      Discharge Follow-up with Specified Provider: Anay; 2 Weeks   As directed      To: Anay    Follow Up: 2 Weeks         Remove Dressing   As directed      Number of Days: 2               Test Results Pending at Discharge      Matthew Farah Jr., MD  08/03/22  08:12 EDT

## 2022-08-03 NOTE — PLAN OF CARE
Problem: Adult Inpatient Plan of Care  Goal: Plan of Care Review  Recent Flowsheet Documentation  Taken 8/3/2022 1046 by Sofya Kahn OT  Plan of Care Reviewed With:   patient   parent  Outcome Evaluation: OT IE completed. Pt is A/Ox4 and participates in therapy with encouragement. Pt limited by anxiety. Passed OT mobility screen. Up with Supervision. No AD, no LOB, no h/o falls. No PT needed. Education completed for precautions and ADL retraining. Mod A UB dressing. AROM completed at R hand, wrist, elbow. Pt reluctant to move R shoulder. Pt is WBAT and ROM at tolerated and encouraged to begin using RUE with daily activities. Plan is home with mother's assist today. OT will d/c at this time.

## 2022-08-03 NOTE — THERAPY DISCHARGE NOTE
Acute Care - Occupational Therapy Discharge  [facility]    Patient Name: Juanis Zambrano  : 2000    MRN: [id number]                              Today's Date: 8/3/2022       Admit Date: 2022    Visit Dx:     ICD-10-CM ICD-9-CM   1. Snapping scapula syndrome of right shoulder  M24.111 733.99     Patient Active Problem List   Diagnosis   • Snapping scapula syndrome of right shoulder     Past Medical History:   Diagnosis Date   • Asthma      Past Surgical History:   Procedure Laterality Date   • WISDOM TOOTH EXTRACTION        General Information     Row Name 22 1037          OT Time and Intention    Document Type evaluation;discharge treatment  -TB     Mode of Treatment occupational therapy;individual therapy  -TB     Row Name 22 1037          General Information    Patient Profile Reviewed yes  -TB     Prior Level of Function independent:;all household mobility;community mobility;transfer;bed mobility;ADL's  -TB     Existing Precautions/Restrictions other (see comments)  WBAT, ROM as tolerated RUE  -TB     Barriers to Rehab none identified  -TB     Row Name 22 1037          Occupational Profile    Reason for Services/Referral (Occupational Profile) Occupational decline  -TB     Row Name 22 1037          Living Environment    People in Home parent(s)  -TB     Row Name 22 1037          Home Main Entrance    Number of Stairs, Main Entrance two  -TB     Stair Railings, Main Entrance none  -TB     Row Name 22 1037          Stairs Within Home, Primary    Stairs, Within Home, Primary Pt lives on first floor  -TB     Number of Stairs, Within Home, Primary none  -TB     Row Name 22 1037          Cognition    Orientation Status (Cognition) oriented x 4  -TB     Row Name 22 1037          Safety Issues, Functional Mobility    Impairments Affecting Function (Mobility) pain;strength;sensation/sensory awareness;range of motion (ROM)  -TB     Comment, Safety  Issues/Impairments (Mobility) Pt passed OT mobility screen. Up with Supervision. No AD, no LOB, no h/o falls. No PT needed.  -           User Key  (r) = Recorded By, (t) = Taken By, (c) = Cosigned By    Initials Name Provider Type    TB Sofya Kahn, OT Occupational Therapist               Mobility/ADL's     Row Name 08/03/22 1040          Bed Mobility    Bed Mobility bed mobility (all) activities  -     All Activities, [place] (Bed Mobility) independent  -     Row Name 08/03/22 1040          Transfers    Transfers sit-stand transfer;toilet transfer  -     Sit-Stand [place] (Transfers) supervision  -     [place] Level (Toilet Transfer) supervision  -     Row Name 08/03/22 1040          Toilet Transfer    Type (Toilet Transfer) sit-stand;stand-sit  -     Row Name 08/03/22 1040          Functional Mobility    Functional Mobility- Ind. Level supervision required  -     Functional Mobility- Comment Pt passed OT mobility screen. Up with Supervision. No AD, no LOB, no h/o falls. No PT needed.  -     Row Name 08/03/22 1040          Activities of Daily Living    BADL Assessment/Intervention bathing;upper body dressing;lower body dressing;toileting  -     Row Name 08/03/22 1040          Mobility    Extremity Weight-bearing Status right upper extremity  -     Right Upper Extremity (Weight-bearing Status) weight-bearing as tolerated (WBAT)   -     Row Name 08/03/22 1040          Bathing Assessment/Intervention    [place] Level (Bathing) minimum assist (75% patient effort);upper body;bathing skills  -     Position (Bathing) edge of bed sitting  -TB     Comment, (Bathing) Education completed for ADL retraining and axilla care. Mother present for teaching.  -     Row Name 08/03/22 1040          Upper Body Dressing Assessment/Training    [place] Level (Upper Body Dressing) doff;pajama/robe;don;pull-over garment;moderate assist (50% patient effort);verbal cues  -TB      Position (Upper Body Dressing) edge of bed sitting  -TB     Comment, (Upper Body Dressing) Education comleted for ADL retraining. Mother present for teaching.  -TB     Row Name 08/03/22 1040          Lower Body Dressing Assessment/Training    [place] Level (Lower Body Dressing) don;pants/bottoms;moderate assist (50% patient effort);verbal cues  -TB     Position (Lower Body Dressing) edge of bed sitting;unsupported standing  -TB     Row Name 08/03/22 1040          Toileting Assessment/Training    [place] Level (Toileting) moderate assist (50% patient effort);adjust/manage clothing;change pad/brief;set up;perform perineal hygiene  -TB     Position (Toileting) unsupported sitting;unsupported standing  -TB           User Key  (r) = Recorded By, (t) = Taken By, (c) = Cosigned By    Initials Name Provider Type    TB Sofya Kahn, OT Occupational Therapist               Obj/Interventions     Row Name 08/03/22 1043          Sensory Assessment (Somatosensory)    Sensory Assessment (Somatosensory) right UE  -TB     Sensory Subjective Reports numbness;tingling  Pt reports residual numbness/tingling related to pre-operative block  -TB     Row Name 08/03/22 1043          Vision Assessment/Intervention    Visual Impairment/Limitations WNL  -TB     Row Name 08/03/22 1043          Range of Motion Comprehensive    Comment, General Range of Motion LUE AROM WNL. Pt reluctant to move R shoulder. R hand, wrist, and elbow intact.  -TB     Row Name 08/03/22 1043          Strength Comprehensive (MMT)    Comment, General Manual Muscle Testing (MMT) Assessment Generalized weakness/deconditioned  -TB     Row Name 08/03/22 1043          Elbow/Forearm (Therapeutic Exercise)    Elbow/Forearm (Therapeutic Exercise) AROM (active range of motion)  -TB     Elbow/Forearm AROM (Therapeutic Exercise) right;flexion;extension;supination;pronation;sitting;10 repetitions  -TB     Row Name 08/03/22 1043          Wrist (Therapeutic  Exercise)    Wrist (Therapeutic Exercise) AROM (active range of motion)  -TB     Wrist AROM (Therapeutic Exercise) right;flexion;extension;10 repetitions  -TB     Row Name 08/03/22 1043          Hand (Therapeutic Exercise)    Hand (Therapeutic Exercise) AROM (active range of motion)  -TB     Hand AROM/AAROM (Therapeutic Exercise) right;AROM (active range of motion);finger flexion;finger extension;10 repetitions  -TB     Row Name 08/03/22 1043          Motor Skills    Therapeutic Exercise hand;wrist;elbow/forearm  Education completed for MD orders for RUE WBAT and ROM at tolerated. Pt encouraged to begin using RUE for typical daily activities. Mother present for teaching.  -     Row Name 08/03/22 1043          Balance    Balance Assessment sitting dynamic balance;sit to stand dynamic balance;standing dynamic balance  -     Dynamic Sitting Balance independent  -TB     Position, Sitting Balance unsupported  -TB     Sit to Stand Dynamic Balance supervision  -     Dynamic Standing Balance supervision  -TB     Position/Device Used, Standing Balance unsupported  -TB     Balance Interventions sitting;standing;sit to stand;dynamic;occupation based/functional task  -TB           User Key  (r) = Recorded By, (t) = Taken By, (c) = Cosigned By    Initials Name Provider Type    TB Sofya Kahn, OT Occupational Therapist               Goals/Plan    No documentation.                Clinical Impression     Row Name 08/03/22 1046          Pain Assessment    Pain Intervention(s) Ambulation/increased activity;Repositioned;Cold applied  -TB     Additional Documentation Pain Scale: FACES Pre/Post-Treatment (Group)  -TB     Row Name 08/03/22 1046          Pain Scale: FACES Pre/Post-Treatment    Pain: FACES Scale, Pretreatment 4-->hurts little more  -TB     Posttreatment Pain Rating 4-->hurts little more  -TB     Pain Location - Side/Orientation Right  -TB     Pain Location - shoulder  -TB     Row Name 08/03/22 1040           Plan of Care Review    Plan of Care Reviewed With patient;parent  -TB     Outcome Evaluation OT IE completed. Pt is A/Ox4 and participates in therapy with encouragement. Pt limited by anxiety. Pt passed OT mobility screen. Up with Supervision. No AD, no LOB, no h/o falls. No PT needed. Education completed for precautions and ADL retraining. Mod A UB dressing. AROM completed at R hand, wrist, elbow. Pt reluctant to move R shoulder. Pt is WBAT and ROM at tolerated and encouraged to begin using RUE with daily activities. Plan is home with mother's assist today. OT will d/c at this time.  -TB     Row Name 08/03/22 1046          Therapy Assessment/Plan (OT)    Therapy Frequency (OT) evaluation only  -TB     Row Name 08/03/22 1046          Therapy Plan Review/Discharge Plan (OT)    Anticipated Discharge Disposition (OT) home with assist  -TB     Row Name 08/03/22 1046          Vital Signs    Pre Systolic BP Rehab --  RN cleared OT  -TB     O2 Delivery Pre Treatment room air  -TB     O2 Delivery Intra Treatment room air  -TB     O2 Delivery Post Treatment room air  -TB     Pre Patient Position Supine  -TB     Intra Patient Position Standing  -TB     Post Patient Position Supine  -TB     Row Name 08/03/22 1046          Positioning and Restraints    Pre-Treatment Position in bed  -TB     Post Treatment Position bed  -TB     In Bed notified nsg;supine;call light within reach;encouraged to call for assist;exit alarm on  -TB           User Key  (r) = Recorded By, (t) = Taken By, (c) = Cosigned By    Initials Name Provider Type    TB Sofya Kahn, OT Occupational Therapist               Outcome Measures     Row Name 08/03/22 1051          How much help from another is currently needed...    Putting on and taking off regular lower body clothing? 2  -TB     Bathing (including washing, rinsing, and drying) 2  -TB     Toileting (which includes using toilet bed pan or urinal) 2  -TB     Putting on and taking off regular  upper body clothing 2  -TB     Taking care of personal grooming (such as brushing teeth) 3  -TB     Eating meals 3  -TB     AM-PAC 6 Clicks Score (OT) 14  -TB     Row Name 08/03/22 1051          Functional Assessment    Outcome Measure Options AM-PAC 6 Clicks Daily Activity (OT)  -TB           User Key  (r) = Recorded By, (t) = Taken By, (c) = Cosigned By    Initials Name Provider Type    TB Sofya Kahn OT Occupational Therapist              Occupational Therapy Education                 Title: PT OT SLP Therapies (In Progress)     Topic: Occupational Therapy (In Progress)     Point: ADL training (Done)     Description:   Instruct learner(s) on proper safety adaptation and remediation techniques during self care or transfers.   Instruct in proper use of assistive devices.              Learning Progress Summary           Patient Acceptance, E,D, VU,DU,NR by  at 8/3/2022 1052                   Point: Home exercise program (Done)     Description:   Instruct learner(s) on appropriate technique for monitoring, assisting and/or progressing therapeutic exercises/activities.              Learning Progress Summary           Patient Acceptance, E,D, VU,DU,NR by  at 8/3/2022 1052                   Point: Precautions (Done)     Description:   Instruct learner(s) on prescribed precautions during self-care and functional transfers.              Learning Progress Summary           Patient Acceptance, E,D, VU,DU,NR by  at 8/3/2022 1052                   Point: Body mechanics (Not Started)     Description:   Instruct learner(s) on proper positioning and spine alignment during self-care, functional mobility activities and/or exercises.              Learner Progress:  Not documented in this visit.                      User Key     Initials Effective Dates Name Provider Type Discipline     06/16/21 -  Sofya Kahn OT Occupational Therapist OT              OT Recommendation and Plan  Therapy Frequency  (OT): evaluation only  Plan of Care Review  Plan of Care Reviewed With: patient, parent  Outcome Evaluation: OT IE completed. Pt is A/Ox4 and participates in therapy with encouragement. Pt limited by anxiety. Pt passed OT mobility screen. Up with Supervision. No AD, no LOB, no h/o falls. No PT needed. Education completed for precautions and ADL retraining. Mod A UB dressing. AROM completed at R hand, wrist, elbow. Pt reluctant to move R shoulder. Pt is WBAT and ROM at tolerated and encouraged to begin using RUE with daily activities. Plan is home with mother's assist today. OT will d/c at this time.  Plan of Care Reviewed With: patient, parent  Outcome Evaluation: OT IE completed. Pt is A/Ox4 and participates in therapy with encouragement. Pt limited by anxiety. Pt passed OT mobility screen. Up with Supervision. No AD, no LOB, no h/o falls. No PT needed. Education completed for precautions and ADL retraining. Mod A UB dressing. AROM completed at R hand, wrist, elbow. Pt reluctant to move R shoulder. Pt is WBAT and ROM at tolerated and encouraged to begin using RUE with daily activities. Plan is home with mother's assist today. OT will d/c at this time.     Time Calculation:    Time Calculation- OT     Row Name 08/03/22 0849             Time Calculation- OT    OT Start Time 0849  -TB      OT Received On 08/03/22  -TB              Timed Charges    15781 - OT Self Care/Mgmt Minutes 20  -TB              Untimed Charges    OT Eval/Re-eval Minutes 40  -TB              Total Minutes    Timed Charges Total Minutes 20  -TB      Untimed Charges Total Minutes 40  -TB       Total Minutes 60  -TB            User Key  (r) = Recorded By, (t) = Taken By, (c) = Cosigned By    Initials Name Provider Type    TB Sofya Kahn OT Occupational Therapist              Therapy Charges for Today     Code Description Service Date Service Provider Modifiers Qty    [id number]  OT SELF CARE/MGMT/TRAIN EA 15 MIN 8/3/2022 Criss  Sofya Whittington OT GO 1    [id number]  OT EVAL LOW COMPLEXITY 3 8/3/2022 Sofya Kahn OT GO 1             OT Discharge Summary  Anticipated Discharge Disposition (OT): home with assist    Sofya Kahn OT  8/3/2022

## 2023-05-15 ENCOUNTER — OFFICE VISIT (OUTPATIENT)
Dept: INTERNAL MEDICINE | Facility: CLINIC | Age: 23
End: 2023-05-15
Payer: MEDICAID

## 2023-05-15 VITALS
DIASTOLIC BLOOD PRESSURE: 60 MMHG | HEART RATE: 75 BPM | HEIGHT: 64 IN | TEMPERATURE: 98 F | BODY MASS INDEX: 19.63 KG/M2 | OXYGEN SATURATION: 99 % | WEIGHT: 115 LBS | RESPIRATION RATE: 16 BRPM | SYSTOLIC BLOOD PRESSURE: 98 MMHG

## 2023-05-15 DIAGNOSIS — F41.1 GAD (GENERALIZED ANXIETY DISORDER): Primary | ICD-10-CM

## 2023-05-15 DIAGNOSIS — F32.A DEPRESSION, UNSPECIFIED DEPRESSION TYPE: ICD-10-CM

## 2023-05-15 DIAGNOSIS — R25.2 LEG CRAMPS: ICD-10-CM

## 2023-05-15 DIAGNOSIS — M25.562 ACUTE PAIN OF LEFT KNEE: ICD-10-CM

## 2023-05-15 DIAGNOSIS — J45.20 MILD INTERMITTENT ASTHMA WITHOUT COMPLICATION: ICD-10-CM

## 2023-05-15 PROBLEM — F41.9 ANXIETY: Status: ACTIVE | Noted: 2023-05-15

## 2023-05-15 PROCEDURE — 99203 OFFICE O/P NEW LOW 30 MIN: CPT | Performed by: FAMILY MEDICINE

## 2023-05-15 PROCEDURE — 1160F RVW MEDS BY RX/DR IN RCRD: CPT | Performed by: FAMILY MEDICINE

## 2023-05-15 PROCEDURE — 1159F MED LIST DOCD IN RCRD: CPT | Performed by: FAMILY MEDICINE

## 2023-05-15 RX ORDER — ESCITALOPRAM OXALATE 20 MG/1
1 TABLET ORAL DAILY
COMMUNITY
Start: 2023-04-21 | End: 2023-05-15 | Stop reason: SDUPTHER

## 2023-05-15 RX ORDER — ESCITALOPRAM OXALATE 20 MG/1
20 TABLET ORAL DAILY
Qty: 30 TABLET | Refills: 4 | Status: SHIPPED | OUTPATIENT
Start: 2023-05-15

## 2023-05-15 RX ORDER — ALBUTEROL SULFATE 90 UG/1
2 AEROSOL, METERED RESPIRATORY (INHALATION) EVERY 6 HOURS PRN
Qty: 18 G | Refills: 4 | Status: SHIPPED | OUTPATIENT
Start: 2023-05-15

## 2023-05-15 NOTE — PROGRESS NOTES
"    Office Note     Name: Juanis Zambrano    : 2000     MRN: [id number]     Chief Complaint  Establish Care, Anxiety (Needing new PCP to continue to prescribe medications), Depression (Needing new PCP to continue to prescribe medications), and Med Refill    Subjective     History of Present Illness:  Juanis Zambrano is a 22 y.o. female who presents today for establishing care and she has a history of anxiety and depression and asthma and these are stable with her current medication and she needs refills.  She is not a smoker and has no other chronic health problems.  She has had some left knee pain on and off and she is a runner and she saw the doctor on her campus and they told her to take a break from running for a while and then when she started back she had some pain just in the medial part of the knee and some swelling.  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    Review of Systems   Respiratory: Negative for cough, shortness of breath and wheezing.    Cardiovascular: Negative for chest pain and palpitations.   Gastrointestinal: Negative for blood in stool, diarrhea and vomiting.   Genitourinary: Negative for dysuria, flank pain and genital sores.       Objective     Vital Signs  BP 98/60 (Cuff Size: Adult)   Pulse 75   Temp 98 °F (36.7 °C) (Infrared)   Resp 16   Ht 162.6 cm (64\")   Wt 52.2 kg (115 lb)   SpO2 99%   BMI 19.74 kg/m²   Estimated body mass index is 19.74 kg/m² as calculated from the following:    Height as of this encounter: 162.6 cm (64\").    Weight as of this encounter: 52.2 kg (115 lb).    BMI is within normal parameters. No other follow-up for BMI required.      Physical Exam  Vitals and nursing note " reviewed.   HENT:      Head: Normocephalic and atraumatic.   Neck:      Vascular: No carotid bruit.   Cardiovascular:      Rate and Rhythm: Normal rate and regular rhythm.      Heart sounds: Normal heart sounds. No murmur heard.    No gallop.   Pulmonary:      Effort: Pulmonary effort is normal. No respiratory distress.      Breath sounds: No stridor. No wheezing, rhonchi or rales.   Musculoskeletal:      Cervical back: Normal range of motion and neck supple.      Right lower leg: No edema.      Left lower leg: No edema.   Lymphadenopathy:      Cervical: No cervical adenopathy.   Neurological:      Mental Status: She is alert.                 Assessment and Plan     Diagnoses and all orders for this visit:    1. TORY (generalized anxiety disorder) (Primary) stable with her current medication and she will continue this and refills are given  -     escitalopram (LEXAPRO) 20 MG tablet; Take 1 tablet by mouth Daily.  Dispense: 30 tablet; Refill: 4    2. Depression, unspecified depression type stable with her current medication and she will continue this and refills are given-     escitalopram (LEXAPRO) 20 MG tablet; Take 1 tablet by mouth Daily.  Dispense: 30 tablet; Refill: 4    3. Mild intermittent asthma without complication stable  -     albuterol sulfate  (90 Base) MCG/ACT inhaler; Inhale 2 puffs Every 6 (Six) Hours As Needed for Wheezing.  Dispense: 18 g; Refill: 4    4. Leg cramps-recommend increased hydration and magnesium otc    5. Left knee pain-recommend soft knee sleeve      Follow Up  Return in about 4 months (around 9/15/2023) for Recheck.    Alexia Lee DO

## 2023-05-19 ENCOUNTER — PATIENT ROUNDING (BHMG ONLY) (OUTPATIENT)
Dept: INTERNAL MEDICINE | Facility: CLINIC | Age: 23
End: 2023-05-19
Payer: COMMERCIAL

## 2023-05-19 NOTE — PROGRESS NOTES
A [employer] message has been sent to the patient for patient rounding with [facility]

## 2023-10-20 ENCOUNTER — OFFICE VISIT (OUTPATIENT)
Dept: INTERNAL MEDICINE | Facility: CLINIC | Age: 23
End: 2023-10-20
Payer: MEDICAID

## 2023-10-20 VITALS
SYSTOLIC BLOOD PRESSURE: 104 MMHG | WEIGHT: 110 LBS | HEART RATE: 92 BPM | DIASTOLIC BLOOD PRESSURE: 70 MMHG | HEIGHT: 64 IN | RESPIRATION RATE: 16 BRPM | OXYGEN SATURATION: 98 % | BODY MASS INDEX: 18.78 KG/M2 | TEMPERATURE: 98.2 F

## 2023-10-20 DIAGNOSIS — H69.93 DYSFUNCTION OF BOTH EUSTACHIAN TUBES: ICD-10-CM

## 2023-10-20 DIAGNOSIS — R51.9 NONINTRACTABLE HEADACHE, UNSPECIFIED CHRONICITY PATTERN, UNSPECIFIED HEADACHE TYPE: ICD-10-CM

## 2023-10-20 DIAGNOSIS — F41.1 GAD (GENERALIZED ANXIETY DISORDER): Primary | ICD-10-CM

## 2023-10-20 DIAGNOSIS — F33.8 OTHER RECURRENT DEPRESSIVE DISORDERS: ICD-10-CM

## 2023-10-20 DIAGNOSIS — M26.609 TMJ DYSFUNCTION: ICD-10-CM

## 2023-10-20 DIAGNOSIS — J30.9 ALLERGIC RHINITIS, UNSPECIFIED SEASONALITY, UNSPECIFIED TRIGGER: ICD-10-CM

## 2023-10-20 PROCEDURE — 99214 OFFICE O/P EST MOD 30 MIN: CPT | Performed by: FAMILY MEDICINE

## 2023-10-20 PROCEDURE — 1159F MED LIST DOCD IN RCRD: CPT | Performed by: FAMILY MEDICINE

## 2023-10-20 PROCEDURE — 1160F RVW MEDS BY RX/DR IN RCRD: CPT | Performed by: FAMILY MEDICINE

## 2023-10-20 RX ORDER — FLUTICASONE PROPIONATE 50 MCG
2 SPRAY, SUSPENSION (ML) NASAL DAILY
Qty: 18.2 ML | Refills: 3 | Status: SHIPPED | OUTPATIENT
Start: 2023-10-20

## 2023-10-20 RX ORDER — BUPROPION HYDROCHLORIDE 150 MG/1
150 TABLET ORAL DAILY
Qty: 30 TABLET | Refills: 3 | Status: SHIPPED | OUTPATIENT
Start: 2023-10-20

## 2023-10-20 NOTE — PROGRESS NOTES
"    Office Note     Name: Juanis Zambrano    : 2000     MRN: [id number]     Chief Complaint  Anxiety/Depression (Feels like she needs medication adjustment)    Subjective     History of Present Illness:  Juanis Zambrano is a 22 y.o. female who presents today for several concerns.  She states that she feels her anxiety and depression are coming back and the Lexapro is not helping as much as it was initially and it could be the days are getting shorter and darker and is a seasonal issue.  She is having more stress at school.  She is grinding her teeth and both of her ears are hurting and her allergies are bothering her and she is having headaches when she wakes up and she does have a problem with her mandible that she has had since she was born and she is aware of that with a TMJ issue.  She denies any suicidal or homicidal ideations.  She missed a few days of her Lexapro and she felt worse and she does feel like it is helping to some degree.    Review of Systems   Respiratory:  Negative for cough, shortness of breath and wheezing.    Cardiovascular:  Negative for chest pain and palpitations.   Gastrointestinal:  Negative for blood in stool, diarrhea and vomiting.   Genitourinary:  Negative for dysuria, flank pain and genital sores.       Objective     Vital Signs  /70   Pulse 92   Temp 98.2 °F (36.8 °C) (Infrared)   Resp 16   Ht 162.6 cm (64.02\")   Wt 49.9 kg (110 lb)   SpO2 98%   BMI 18.87 kg/m²   Estimated body mass index is 18.87 kg/m² as calculated from the following:    Height as of this encounter: 162.6 cm (64.02\").    Weight as of this encounter: 49.9 kg (110 lb).    BMI is within normal parameters. No other follow-up for BMI required.      Physical Exam  Vitals and nursing note reviewed.   HENT:      Head: Normocephalic and atraumatic.   Neck:      Vascular: No carotid bruit.   Cardiovascular:      Rate and Rhythm: Normal rate and regular rhythm.      Heart sounds: Normal " heart sounds. No murmur heard.     No gallop.   Pulmonary:      Effort: Pulmonary effort is normal. No respiratory distress.      Breath sounds: No stridor. No wheezing, rhonchi or rales.   Musculoskeletal:      Cervical back: Normal range of motion and neck supple.      Right lower leg: No edema.      Left lower leg: No edema.   Lymphadenopathy:      Cervical: No cervical adenopathy.   Neurological:      Mental Status: She is alert.                 Assessment and Plan     Diagnoses and all orders for this visit:    1. TORY (generalized anxiety disorder) (Primary) add Wellbutrin and return in 2 weeks and discussed side effects and risks  -     buPROPion XL (Wellbutrin XL) 150 MG 24 hr tablet; Take 1 tablet by mouth Daily.  Dispense: 30 tablet; Refill: 3    2. Other recurrent depressive disorders  -     buPROPion XL (Wellbutrin XL) 150 MG 24 hr tablet; Take 1 tablet by mouth Daily.  Dispense: 30 tablet; Refill: 3    3. TMJ dysfunction    4. Dysfunction of both eustachian tubes  -     fluticasone (FLONASE) 50 MCG/ACT nasal spray; 2 sprays into the nostril(s) as directed by provider Daily.  Dispense: 18.2 mL; Refill: 3    5. Nonintractable headache, unspecified chronicity pattern, unspecified headache type    6. Allergic rhinitis, unspecified seasonality, unspecified trigger  -     fluticasone (FLONASE) 50 MCG/ACT nasal spray; 2 sprays into the nostril(s) as directed by provider Daily.  Dispense: 18.2 mL; Refill: 3          Follow Up  Return in about 2 weeks (around 11/3/2023) for Recheck.    Alexia Lee DO

## 2024-01-04 ENCOUNTER — OFFICE VISIT (OUTPATIENT)
Dept: INTERNAL MEDICINE | Facility: CLINIC | Age: 24
End: 2024-01-04
Payer: COMMERCIAL

## 2024-01-04 VITALS
HEART RATE: 96 BPM | BODY MASS INDEX: 18.78 KG/M2 | OXYGEN SATURATION: 99 % | WEIGHT: 110 LBS | DIASTOLIC BLOOD PRESSURE: 76 MMHG | TEMPERATURE: 98.7 F | SYSTOLIC BLOOD PRESSURE: 110 MMHG | HEIGHT: 64 IN

## 2024-01-04 DIAGNOSIS — K58.1 IRRITABLE BOWEL SYNDROME WITH CONSTIPATION: Primary | ICD-10-CM

## 2024-01-04 PROCEDURE — 99213 OFFICE O/P EST LOW 20 MIN: CPT | Performed by: FAMILY MEDICINE

## 2024-01-04 NOTE — PATIENT INSTRUCTIONS
More information on FODMAP diet is at the [facility] site in [place], and at [employer]    FODMAP challenge.  Mannitol: Celery, cauliflower.  Sorbitol: Prunes, green cabbage.  Fructose: Honey, 1 cup broccoli.  Oligosaccharide: Scallions, beans and chickpeas.

## 2024-01-04 NOTE — PROGRESS NOTES
"Subjective   Juanis Zambrano is a 23 y.o. female.     Chief Complaint   Patient presents with    Constipation     Couple weeks.  Tried Colace 5 per day for 3 days.  Caused gas.         Visit Vitals  /76 (BP Location: Left arm, Patient Position: Sitting, Cuff Size: Adult)   Pulse 96   Temp 98.7 °F (37.1 °C)   Ht 162.6 cm (64\")   Wt 49.9 kg (110 lb)   LMP 12/28/2023 (Approximate)   SpO2 99%   Breastfeeding No   BMI 18.88 kg/m²         Constipation  This is a new problem. The current episode started 1 to 4 weeks ago. The problem has been gradually worsening since onset. Stool frequency: pt passing pellets a few times per day. The stool is described as pellet like. The patient is on a high fiber diet. She Exercises regularly. There has Been adequate water intake. Associated symptoms include back pain, bloating and flatus. Pertinent negatives include no abdominal pain, anorexia, diarrhea, difficulty urinating, fecal incontinence, fever, hematochezia, hemorrhoids, melena, nausea, rectal pain, vomiting or weight loss. She has tried stool softeners and fiber for the symptoms. There is no history of abdominal surgery, endocrine disease, inflammatory bowel disease, irritable bowel syndrome, metabolic disease, neurologic disease, neuromuscular disease, psychiatric history or radiation treatment.      Pt reports that she did not even start the wellbutrin because of side effects listed.  Pt feels depression currently stable.     Pt has had intermittent constipation since childhood.   Miralax does not work. Pt did enema a yr ago without results.   Pt is drinking fluid. Pt is eating fiber. Colace did not help.  The following portions of the patient's history were reviewed and updated as appropriate: allergies, current medications, past family history, past medical history, past social history, past surgical history, and problem list.    Past Medical History:   Diagnosis Date    Anxiety     Asthma     Depression     "   Past Surgical History:   Procedure Laterality Date    SHOULDER ARTHROSCOPY Right 08/02/2022    Procedure: SCAPULOTHORACIC BURSECTOMY RIGHT;  Surgeon: Matthew Farah Jr., MD;  Location: [facility];  Service: Orthopedics;  Laterality: Right;    SHOULDER SURGERY Left 09/21/2023    WISDOM TOOTH EXTRACTION        Family History   Problem Relation Age of Onset    Miscarriages / Stillbirths Mother         Miscarriage      Social History     Socioeconomic History    Marital status: Single   Tobacco Use    Smoking status: Never    Smokeless tobacco: Never   Vaping Use    Vaping Use: Never used   Substance and Sexual Activity    Alcohol use: Never    Drug use: Never    Sexual activity: Never      No Known Allergies    Review of Systems   Constitutional:  Negative for fever and weight loss.   Gastrointestinal:  Positive for bloating, constipation and flatus. Negative for abdominal pain, anorexia, diarrhea, hematochezia, hemorrhoids, melena, nausea, rectal pain and vomiting.   Genitourinary:  Negative for difficulty urinating.   Musculoskeletal:  Positive for back pain.       Objective   Physical Exam  Vitals and nursing note reviewed.   Constitutional:       Appearance: She is well-developed.   HENT:      Head: Normocephalic.      Right Ear: External ear normal.      Left Ear: External ear normal.      Nose: Nose normal.   Eyes:      General: Lids are normal.      Conjunctiva/sclera: Conjunctivae normal.      Pupils: Pupils are equal, round, and reactive to light.   Neck:      Thyroid: No thyroid mass or thyromegaly.      Vascular: No carotid bruit.      Trachea: Trachea normal.   Cardiovascular:      Rate and Rhythm: Normal rate and regular rhythm.      Heart sounds: No murmur heard.  Pulmonary:      Effort: Pulmonary effort is normal. No respiratory distress.      Breath sounds: Normal breath sounds. No decreased breath sounds, wheezing, rhonchi or rales.   Chest:      Chest wall: No tenderness.   Abdominal:       General: Bowel sounds are normal.      Palpations: Abdomen is soft.      Tenderness: There is no abdominal tenderness.   Musculoskeletal:         General: Normal range of motion.      Cervical back: Normal range of motion and neck supple.   Skin:     General: Skin is warm and dry.   Neurological:      Mental Status: She is alert and oriented to person, place, and time.   Psychiatric:         Behavior: Behavior normal.         Assessment & Plan   Problems Addressed this Visit    None  Visit Diagnoses       Irritable bowel syndrome with constipation    -  Primary    Relevant Medications    linaclotide (Linzess) 72 MCG capsule capsule          Diagnoses         Codes Comments    Irritable bowel syndrome with constipation    -  Primary ICD-10-CM: K58.1  ICD-9-CM: 564.1                        Current Outpatient Medications:     albuterol sulfate  (90 Base) MCG/ACT inhaler, Inhale 2 puffs Every 6 (Six) Hours As Needed for Wheezing., Disp: 18 g, Rfl: 4    escitalopram (LEXAPRO) 20 MG tablet, Take 1 tablet by mouth Daily., Disp: 30 tablet, Rfl: 4    fluticasone (FLONASE) 50 MCG/ACT nasal spray, 2 sprays into the nostril(s) as directed by provider Daily., Disp: 18.2 mL, Rfl: 3    linaclotide (Linzess) 72 MCG capsule capsule, Take 1 capsule by mouth Every Morning Before Breakfast., Disp: 30 capsule, Rfl: 1    Return if symptoms worsen or fail to improve, for Recheck.

## 2024-02-05 ENCOUNTER — TELEPHONE (OUTPATIENT)
Dept: INTERNAL MEDICINE | Facility: CLINIC | Age: 24
End: 2024-02-05
Payer: MEDICAID

## 2024-02-05 DIAGNOSIS — M25.50 ARTHRALGIA, UNSPECIFIED JOINT: Primary | ICD-10-CM

## 2024-02-05 NOTE — TELEPHONE ENCOUNTER
Caller: Juanis Zambrano    Relationship: Self    Best call back number: [phone]    What orders are you requesting (i.e. lab or imaging): BLOOD WORK TO TEST FOR ARTHRITIS    In what timeframe would the patient need to come in: AS SOON AS POSSIBLE    Where will you receive your lab/imaging services: IN OFFICE

## 2024-02-06 NOTE — TELEPHONE ENCOUNTER
Spoke with patient and let her know  put orders in to have blood work. Patient verbalized understanding with no further questions.

## 2024-02-07 ENCOUNTER — LAB (OUTPATIENT)
Dept: INTERNAL MEDICINE | Facility: CLINIC | Age: 24
End: 2024-02-07
Payer: MEDICAID

## 2024-02-07 DIAGNOSIS — M25.50 ARTHRALGIA, UNSPECIFIED JOINT: ICD-10-CM

## 2024-02-07 LAB
CHROMATIN AB SERPL-ACNC: <10 IU/ML (ref 0–14)
CK SERPL-CCNC: 83 U/L (ref 20–180)
URATE SERPL-MCNC: 2.9 MG/DL (ref 2.4–5.7)

## 2024-02-07 PROCEDURE — 36415 COLL VENOUS BLD VENIPUNCTURE: CPT | Performed by: FAMILY MEDICINE

## 2024-02-07 PROCEDURE — 82550 ASSAY OF CK (CPK): CPT | Performed by: FAMILY MEDICINE

## 2024-02-07 PROCEDURE — 86431 RHEUMATOID FACTOR QUANT: CPT | Performed by: FAMILY MEDICINE

## 2024-02-07 PROCEDURE — 84550 ASSAY OF BLOOD/URIC ACID: CPT | Performed by: FAMILY MEDICINE

## 2024-02-07 PROCEDURE — 86038 ANTINUCLEAR ANTIBODIES: CPT | Performed by: FAMILY MEDICINE

## 2024-02-08 LAB — ANA SER QL: NEGATIVE

## 2024-05-16 ENCOUNTER — APPOINTMENT (OUTPATIENT)
Dept: GENERAL RADIOLOGY | Facility: HOSPITAL | Age: 24
End: 2024-05-16
Payer: MEDICAID

## 2024-05-16 ENCOUNTER — HOSPITAL ENCOUNTER (EMERGENCY)
Facility: HOSPITAL | Age: 24
Discharge: HOME | End: 2024-05-16
Attending: EMERGENCY MEDICINE
Payer: MEDICAID

## 2024-05-16 VITALS
HEART RATE: 82 BPM | WEIGHT: 110 LBS | OXYGEN SATURATION: 96 % | SYSTOLIC BLOOD PRESSURE: 97 MMHG | RESPIRATION RATE: 16 BRPM | DIASTOLIC BLOOD PRESSURE: 59 MMHG | BODY MASS INDEX: 17.68 KG/M2 | TEMPERATURE: 98.2 F | HEIGHT: 66 IN

## 2024-05-16 DIAGNOSIS — F45.8 HYPERVENTILATION SYNDROME: ICD-10-CM

## 2024-05-16 DIAGNOSIS — F41.0 PANIC ATTACK: Primary | ICD-10-CM

## 2024-05-16 LAB
ALBUMIN SERPL-MCNC: 4.9 G/DL (ref 3.5–5.2)
ALBUMIN/GLOB SERPL: 1.6 G/DL
ALP SERPL-CCNC: 61 U/L (ref 39–117)
ALT SERPL W P-5'-P-CCNC: 15 U/L (ref 1–33)
ANION GAP SERPL CALCULATED.3IONS-SCNC: 14 MMOL/L (ref 5–15)
AST SERPL-CCNC: 22 U/L (ref 1–32)
BASOPHILS # BLD AUTO: 0.03 10*3/MM3 (ref 0–0.2)
BASOPHILS NFR BLD AUTO: 0.5 % (ref 0–1.5)
BILIRUB SERPL-MCNC: 0.3 MG/DL (ref 0–1.2)
BUN SERPL-MCNC: 10 MG/DL (ref 6–20)
BUN/CREAT SERPL: 12.2 (ref 7–25)
CALCIUM SPEC-SCNC: 9.9 MG/DL (ref 8.6–10.5)
CHLORIDE SERPL-SCNC: 105 MMOL/L (ref 98–107)
CO2 SERPL-SCNC: 23 MMOL/L (ref 22–29)
CREAT SERPL-MCNC: 0.82 MG/DL (ref 0.57–1)
DEPRECATED RDW RBC AUTO: 41.4 FL (ref 37–54)
EGFRCR SERPLBLD CKD-EPI 2021: 103.9 ML/MIN/1.73
EOSINOPHIL # BLD AUTO: 0.01 10*3/MM3 (ref 0–0.4)
EOSINOPHIL NFR BLD AUTO: 0.2 % (ref 0.3–6.2)
ERYTHROCYTE [DISTWIDTH] IN BLOOD BY AUTOMATED COUNT: 13.2 % (ref 12.3–15.4)
GEN 5 2HR TROPONIN T REFLEX: <6 NG/L
GLOBULIN UR ELPH-MCNC: 3 GM/DL
GLUCOSE SERPL-MCNC: 106 MG/DL (ref 65–99)
HCT VFR BLD AUTO: 39.2 % (ref 34–46.6)
HGB BLD-MCNC: 12.8 G/DL (ref 12–15.9)
HOLD SPECIMEN: NORMAL
IMM GRANULOCYTES # BLD AUTO: 0.01 10*3/MM3 (ref 0–0.05)
IMM GRANULOCYTES NFR BLD AUTO: 0.2 % (ref 0–0.5)
LYMPHOCYTES # BLD AUTO: 1.37 10*3/MM3 (ref 0.7–3.1)
LYMPHOCYTES NFR BLD AUTO: 24.4 % (ref 19.6–45.3)
MCH RBC QN AUTO: 28.3 PG (ref 26.6–33)
MCHC RBC AUTO-ENTMCNC: 32.7 G/DL (ref 31.5–35.7)
MCV RBC AUTO: 86.7 FL (ref 79–97)
MONOCYTES # BLD AUTO: 0.38 10*3/MM3 (ref 0.1–0.9)
MONOCYTES NFR BLD AUTO: 6.8 % (ref 5–12)
NEUTROPHILS NFR BLD AUTO: 3.82 10*3/MM3 (ref 1.7–7)
NEUTROPHILS NFR BLD AUTO: 67.9 % (ref 42.7–76)
NRBC BLD AUTO-RTO: 0 /100 WBC (ref 0–0.2)
PLATELET # BLD AUTO: 279 10*3/MM3 (ref 140–450)
PMV BLD AUTO: 11.6 FL (ref 6–12)
POTASSIUM SERPL-SCNC: 3.7 MMOL/L (ref 3.5–5.2)
PROT SERPL-MCNC: 7.9 G/DL (ref 6–8.5)
QT INTERVAL: 354 MS
QTC INTERVAL: 415 MS
RBC # BLD AUTO: 4.52 10*6/MM3 (ref 3.77–5.28)
SODIUM SERPL-SCNC: 142 MMOL/L (ref 136–145)
TROPONIN T DELTA: NORMAL
TROPONIN T SERPL HS-MCNC: <6 NG/L
WBC NRBC COR # BLD AUTO: 5.62 10*3/MM3 (ref 3.4–10.8)
WHOLE BLOOD HOLD COAG: NORMAL
WHOLE BLOOD HOLD SPECIMEN: NORMAL

## 2024-05-16 PROCEDURE — 84484 ASSAY OF TROPONIN QUANT: CPT | Performed by: EMERGENCY MEDICINE

## 2024-05-16 PROCEDURE — 99291 CRITICAL CARE FIRST HOUR: CPT

## 2024-05-16 PROCEDURE — 80053 COMPREHEN METABOLIC PANEL: CPT | Performed by: EMERGENCY MEDICINE

## 2024-05-16 PROCEDURE — 93005 ELECTROCARDIOGRAM TRACING: CPT | Performed by: EMERGENCY MEDICINE

## 2024-05-16 PROCEDURE — 71045 X-RAY EXAM CHEST 1 VIEW: CPT

## 2024-05-16 PROCEDURE — 85025 COMPLETE CBC W/AUTO DIFF WBC: CPT | Performed by: EMERGENCY MEDICINE

## 2024-05-16 PROCEDURE — 99284 EMERGENCY DEPT VISIT MOD MDM: CPT

## 2024-05-16 PROCEDURE — 36415 COLL VENOUS BLD VENIPUNCTURE: CPT

## 2024-05-16 PROCEDURE — 25010000002 LORAZEPAM PER 2 MG: Performed by: EMERGENCY MEDICINE

## 2024-05-16 PROCEDURE — 25810000003 SODIUM CHLORIDE 0.9 % SOLUTION: Performed by: EMERGENCY MEDICINE

## 2024-05-16 PROCEDURE — 96374 THER/PROPH/DIAG INJ IV PUSH: CPT

## 2024-05-16 RX ORDER — ONDANSETRON 2 MG/ML
4 INJECTION INTRAMUSCULAR; INTRAVENOUS ONCE
Status: DISCONTINUED | OUTPATIENT
Start: 2024-05-16 | End: 2024-05-17 | Stop reason: HOSPADM

## 2024-05-16 RX ORDER — SODIUM CHLORIDE 0.9 % (FLUSH) 0.9 %
10 SYRINGE (ML) INJECTION AS NEEDED
Status: DISCONTINUED | OUTPATIENT
Start: 2024-05-16 | End: 2024-05-17 | Stop reason: HOSPADM

## 2024-05-16 RX ORDER — LORAZEPAM 2 MG/ML
1 INJECTION INTRAMUSCULAR
Status: DISCONTINUED | OUTPATIENT
Start: 2024-05-16 | End: 2024-05-17 | Stop reason: HOSPADM

## 2024-05-16 RX ADMIN — SODIUM CHLORIDE 1000 ML: 9 INJECTION, SOLUTION INTRAVENOUS at 15:28

## 2024-05-16 RX ADMIN — LORAZEPAM 1 MG: 2 INJECTION INTRAMUSCULAR; INTRAVENOUS at 15:28

## 2024-05-16 RX ADMIN — SODIUM CHLORIDE 1000 ML: 9 INJECTION, SOLUTION INTRAVENOUS at 16:45

## 2024-05-16 NOTE — ED PROVIDER NOTES
Subjective   History of Present Illness  Patient is a pleasant 23-year-old female who presents to the emergency department with chest discomfort, shortness of breath, anxiety, and hyperventilation.  She has a long history of panic attacks over the past several years.  Has been worse over the past 1 week.  She has been on and off of Lexapro for the past several years.  She initially stopped Lexapro a year or 2 ago as secondary to suicidal ideation but that 1 month ago was started back on a lower dose.  She does not feel as if the Lexapro is helping her.  She currently denies any suicidal thoughts or ideation.  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  She was brought down to the emergency department by patient's charge nurse or nurse leader is accompanying her.      Review of Systems   All other systems reviewed and are negative.      Past Medical History:   Diagnosis Date    Anxiety     Asthma     Depression        No Known Allergies    Past Surgical History:   Procedure Laterality Date    SHOULDER ARTHROSCOPY Right 08/02/2022    Procedure: SCAPULOTHORACIC BURSECTOMY RIGHT;  Surgeon: Matthew Farah Jr., MD;  [facility];  Service: Orthopedics;  Laterality: Right;    SHOULDER SURGERY Left 09/21/2023    WISDOM TOOTH EXTRACTION         Family History   Problem Relation Age of Onset    Miscarriages / Stillbirths Mother         Miscarriage       Social History     Socioeconomic History    Marital status: Single   Tobacco Use    Smoking status: Never    Smokeless tobacco: Never   Vaping Use    Vaping status: Never Used   Substance and Sexual Activity    Alcohol use: Never    Drug use: Never    Sexual activity: Never           Objective   Physical Exam  Vitals and nursing note reviewed.   Constitutional:       Appearance: Normal appearance.   HENT:      Head: Normocephalic.      Nose: Nose normal.       Mouth/Throat:      Mouth: Mucous membranes are dry.   Eyes:      Extraocular Movements: Extraocular movements intact.      Pupils: Pupils are equal, round, and reactive to light.   Cardiovascular:      Rate and Rhythm: Regular rhythm. Tachycardia present.      Pulses: Normal pulses.      Heart sounds: Normal heart sounds. No murmur heard.  Pulmonary:      Effort: Pulmonary effort is normal. No respiratory distress.      Breath sounds: Normal breath sounds. No wheezing or rhonchi.   Abdominal:      General: Bowel sounds are normal. There is no distension.      Palpations: Abdomen is soft.      Tenderness: There is no abdominal tenderness. There is no guarding or rebound.   Musculoskeletal:         General: No swelling, deformity or signs of injury. Normal range of motion.      Cervical back: Normal range of motion.   Skin:     General: Skin is warm and dry.      Capillary Refill: Capillary refill takes less than 2 seconds.   Neurological:      General: No focal deficit present.      Mental Status: She is alert and oriented to person, place, and time.   Psychiatric:         Mood and Affect: Mood is anxious.         Procedures           ED Course  ED Course as of 05/16/24 2249   Th May 16, 2024   1625 Potassium: 3.7 [CP]   2214 We had a telemedicine psychiatry evaluate the patient as she stated that she was uncomfortable going home due to her anxiety.  Ultimately patient declined inpatient treatment.  She declined referral.  States that she would prefer to follow-up with her primary care provider. [CP]      ED Course User Index  [CP] Elton Vanegas DO      Recent Results (from the past 24 hour(s))   Green Top (Gel)    Collection Time: 05/16/24  2:57 PM   Result Value Ref Range    Extra Tube Hold for add-ons.    Lavender Top    Collection Time: 05/16/24  2:57 PM   Result Value Ref Range    Extra Tube hold for add-on    Gold Top - SST    Collection Time: 05/16/24  2:57 PM   Result Value Ref Range    Extra Tube Hold for  add-ons.    Gray Top    Collection Time: 05/16/24  2:57 PM   Result Value Ref Range    Extra Tube Hold for add-ons.    Light Blue Top    Collection Time: 05/16/24  2:57 PM   Result Value Ref Range    Extra Tube Hold for add-ons.    CBC Auto Differential    Collection Time: 05/16/24  2:57 PM    Specimen: Blood   Result Value Ref Range    WBC 5.62 3.40 - 10.80 10*3/mm3    RBC 4.52 3.77 - 5.28 10*6/mm3    Hemoglobin 12.8 12.0 - 15.9 g/dL    Hematocrit 39.2 34.0 - 46.6 %    MCV 86.7 79.0 - 97.0 fL    MCH 28.3 26.6 - 33.0 pg    MCHC 32.7 31.5 - 35.7 g/dL    RDW 13.2 12.3 - 15.4 %    RDW-SD 41.4 37.0 - 54.0 fl    MPV 11.6 6.0 - 12.0 fL    Platelets 279 140 - 450 10*3/mm3    Neutrophil % 67.9 42.7 - 76.0 %    Lymphocyte % 24.4 19.6 - 45.3 %    Monocyte % 6.8 5.0 - 12.0 %    Eosinophil % 0.2 (L) 0.3 - 6.2 %    Basophil % 0.5 0.0 - 1.5 %    Immature Grans % 0.2 0.0 - 0.5 %    Neutrophils, Absolute 3.82 1.70 - 7.00 10*3/mm3    Lymphocytes, Absolute 1.37 0.70 - 3.10 10*3/mm3    Monocytes, Absolute 0.38 0.10 - 0.90 10*3/mm3    Eosinophils, Absolute 0.01 0.00 - 0.40 10*3/mm3    Basophils, Absolute 0.03 0.00 - 0.20 10*3/mm3    Immature Grans, Absolute 0.01 0.00 - 0.05 10*3/mm3    nRBC 0.0 0.0 - 0.2 /100 WBC   Comprehensive Metabolic Panel    Collection Time: 05/16/24  2:57 PM    Specimen: Blood   Result Value Ref Range    Glucose 106 (H) 65 - 99 mg/dL    BUN 10 6 - 20 mg/dL    Creatinine 0.82 0.57 - 1.00 mg/dL    Sodium 142 136 - 145 mmol/L    Potassium 3.7 3.5 - 5.2 mmol/L    Chloride 105 98 - 107 mmol/L    CO2 23.0 22.0 - 29.0 mmol/L    Calcium 9.9 8.6 - 10.5 mg/dL    Total Protein 7.9 6.0 - 8.5 g/dL    Albumin 4.9 3.5 - 5.2 g/dL    ALT (SGPT) 15 1 - 33 U/L    AST (SGOT) 22 1 - 32 U/L    Alkaline Phosphatase 61 39 - 117 U/L    Total Bilirubin 0.3 0.0 - 1.2 mg/dL    Globulin 3.0 gm/dL    A/G Ratio 1.6 g/dL    BUN/Creatinine Ratio 12.2 7.0 - 25.0    Anion Gap 14.0 5.0 - 15.0 mmol/L    eGFR 103.9 >60.0 mL/min/1.73   High  Sensitivity Troponin T    Collection Time: 05/16/24  2:57 PM    Specimen: Blood   Result Value Ref Range    HS Troponin T <6 <14 ng/L   ECG 12 Lead Chest Pain    Collection Time: 05/16/24  2:57 PM   Result Value Ref Range    QT Interval 354 ms    QTC Interval 415 ms   Green Top (Gel)    Collection Time: 05/16/24  5:27 PM   Result Value Ref Range    Extra Tube Hold for add-ons.    High Sensitivity Troponin T 2Hr    Collection Time: 05/16/24  5:27 PM    Specimen: Blood   Result Value Ref Range    HS Troponin T <6 <14 ng/L    Troponin T Delta       Note: In addition to lab results from this visit, the labs listed above may include labs taken at another facility or during a different encounter within the last 24 hours. Please correlate lab times with ED admission and discharge times for further clarification of the services performed during this visit.    XR Chest 1 View   Final Result   Impression:   No acute process identified         Electronically Signed: Stanford Aguirre MD     5/16/2024 3:41 PM EDT     Workstation ID: [id number]        Vitals:    05/16/24 1816 05/16/24 2000 05/16/24 2030 05/16/24 2212   BP:  105/63 97/59    BP Location:       Patient Position:       Pulse: 112 74 69 82   Resp:       Temp:       TempSrc:       SpO2:  96% 95% 96%   Weight:       Height:         Medications   sodium chloride 0.9 % flush 10 mL (has no administration in time range)   LORazepam (ATIVAN) injection 1 mg (1 mg Intravenous Given 5/16/24 1528)   sodium chloride 0.9 % flush 10 mL (has no administration in time range)   sodium chloride 0.9 % flush 10 mL (has no administration in time range)   ondansetron (ZOFRAN) injection 4 mg (0 mg Intravenous Hold 5/16/24 2041)   sodium chloride 0.9 % bolus 1,000 mL (0 mL Intravenous Stopped 5/16/24 1634)   sodium chloride 0.9 % bolus 1,000 mL (0 mL Intravenous Stopped 5/16/24 1815)     ECG/EMG Results (last 24 hours)       Procedure Component Value Units Date/Time    ECG 12 Lead Chest Pain  [[id number]] Collected: 05/16/24 1457     Updated: 05/16/24 1457     QT Interval 354 ms      QTC Interval 415 ms     Narrative:      Test Reason : Chest Pain  Blood Pressure :   */*   mmHG  Vent. Rate :  83 BPM     Atrial Rate :  83 BPM     P-R Int : 134 ms          QRS Dur :  80 ms      QT Int : 354 ms       P-R-T Axes :   7  67  39 degrees     QTc Int : 415 ms    ** Poor data quality, interpretation may be adversely affected  Normal sinus rhythm  Normal ECG  No previous ECGs available    Referred By: ED MD           Confirmed By:           ECG 12 Lead Chest Pain   Preliminary Result   Test Reason : Chest Pain   Blood Pressure :   */*   mmHG   Vent. Rate :  83 BPM     Atrial Rate :  83 BPM      P-R Int : 134 ms          QRS Dur :  80 ms       QT Int : 354 ms       P-R-T Axes :   7  67  39 degrees      QTc Int : 415 ms      ** Poor data quality, interpretation may be adversely affected   Normal sinus rhythm   Normal ECG   No previous ECGs available      Referred By: ED MD           Confirmed By:                                                  Medical Decision Making  Problems Addressed:  Hyperventilation syndrome: complicated acute illness or injury  Panic attack: complicated acute illness or injury    Amount and/or Complexity of Data Reviewed  Labs: ordered. Decision-making details documented in ED Course.  Radiology: ordered and independent interpretation performed. Decision-making details documented in ED Course.  ECG/medicine tests: ordered and independent interpretation performed. Decision-making details documented in ED Course.    Risk  Prescription drug management.        Final diagnoses:   Panic attack   Hyperventilation syndrome       ED Disposition  ED Disposition       ED Disposition   Discharge    Condition   Stable    Comment   --             DISCHARGE    Patient discharged in stable condition.    Reviewed implications of results, diagnosis, meds, responsibility to follow up, warning signs and symptoms  of possible worsening, potential complications and reasons to return to ER.    Patient/Family voiced understanding of above instructions.    Discussed plan for discharge, as there is no emergent indication for admission.  Pt/family is agreeable and understands need for follow up and possible repeat testing.  Pt/family is aware that discharge does not mean that nothing is wrong but that it indicates no emergency is currently present that requires admission and they must continue care with follow-up as given below or with a physician of their choice.     FOLLOW-UP  PATIENT CONNECTION - [place] [id number]  [phone]  Schedule an appointment as soon as possible for a visit       [facility] EMERGENCY DEPARTMENT  [address] [facility] [id number] [phone]    If symptoms worsen         Medication List      No changes were made to your prescriptions during this visit.            Elton Vanegas,   05/16/24 7920

## 2024-05-17 NOTE — CONSULTS
Juanis Tineo [place]  2000    “This provider is located at [facility] located at [address], [address] using a secure Zoom Video Visit. Patient is being seen remotely via telehealth at [address], [address], and stated they are in a secure environment for this session. The patient's condition being diagnosed/treated is appropriate for telemedicine. The provider identified themselves as well as their credentials.   The patient, and/or patients guardian, consent to be seen remotely, and when consent is given they understand that the consent allows for patient identifiable information to be sent to a third party as needed.   They may refuse to be seen remotely at any time. The electronic data is encrypted and password protected, and the patient and/or guardian has been advised of the potential risks to privacy not withstanding such measures.”    Race/Ethnicity: White or   Martial Status: Single  Guardian Name/Contact/etc: Self  Pt Lives With:  Patient lives with roommates  Occupation: Employed  Appearance: clean and casually dressed, appropriate     Time Called for Assessment: 2015  Assessment Start and End: 2054-2116      DATA:   Clinician received a call from [facility] staff for a behavioral health consult.  The patient is agreeable to speak with the behavioral health team.  Met with patient at bedside. Patient is not under 1:1 security monitoring.  The attending treatment team is GABRIEL Muñiz and Dr. Vanegas.  Patient presents today with chief compliant of anxiety. Per triage note, patient arrived due to a panic attack. At discharge, patient reported feeling scared and and told staff that she didn't want to leave. Per RN, patient had superficial cuts on her thigh. Clinician completed assessment with patient and observations are documented as follows.    ASSESSMENT:    Clinician consulted with patient for mental status exam and assessment.  Clinical  "descriptors are documented as follows.  Clinician completed CSSRS with patient for suicide risk assessment.  The results of patient’s CSSRS documented as follows.    Presenting Problems: Patient stated that she presented to the emergency department after she had a severe panic attack. Patient reported that she has them \"often enough\" but stated that she can typically bring herself to \"reality\". Patient did not want to discuss what caused her panic attack attack today. Patient denied SI/HI.     Current Stressors:  Mental health condition. Patient did not want to discuss stressors.    Established Therapy, Medication Management or Other Mental Health Services: Patient stated that she was seeing a counselor through [facility].     Current Psychiatric Medications: Patient reported being prescribed Lexapro by her PCP through [facility].     Mental Status Exam:  Behavior: Withdrawn  Psychomotor Movement: Appropriate  Attention and Cooperation: Adequate and Guarded  Mood: sad and Affect: flat  Orientation: alert and oriented to person, place, and time   Thought Process: linear, logical, and goal directed  Thought Content: normal  Delusions: None   Hallucinations: None and Not demonstrated today   Concentration: Normal  Suicidal Ideation: Absent  Homicidal Ideation: Absent  Hopelessness: Mild  Speech: Minimal  Eye Contact: Fair  Insight:  Judgement:     Depression: 10   Anxiety: 10  Sleep: Fair   Appetite: Poor       Hx of Psychiatric or Detox Hospitalizations:  No  Most recent inpatient admission: N/A    Suicidal Ideation Assessment:    [place]-SUICIDE SEVERITY RATING SCALE  Psychiatric Inpatient Setting - Discharge Screener    Ask questions that are bold and underlined Discharge   Ask Questions 1 and 2 YES NO   Wish to be Dead:   Person endorses thoughts about a wish to be dead or not alive anymore, or wish to fall asleep and not wake up.  While you were here in the hospital, have you wished you were dead or wished you " could go to sleep and not wake up?  X   Suicidal Thoughts:   General non-specific thoughts of wanting to end one's life/die by suicide, “I've thought about killing myself” without general thoughts of ways to kill oneself/associated methods, intent, or plan.   While you were here in the hospital, have you actually had thoughts about killing yourself?   X   If YES to 2, ask questions 3, 4, 5, and 6.  If NO to 2, go directly to question 6   3) Suicidal Thoughts with Method (without Specific Plan or Intent to Act):   Person endorses thoughts of suicide and has thought of a least one method during the assessment period. This is different than a specific plan with time, place or method details worked out. “I thought about taking an overdose but I never made a specific plan as to when where or how I would actually do it….and I would never go through with it.”   Have you been thinking about how you might kill yourself?      4) Suicidal Intent (without Specific Plan):   Active suicidal thoughts of killing oneself and patient reports having some intent to act on such thoughts, as opposed to “I have the thoughts but I definitely will not do anything about them.”   Have you had these thoughts and had some intention of acting on them or do you have some intention of acting on them after you leave the hospital?      5) Suicide Intent with Specific Plan:   Thoughts of killing oneself with details of plan fully or partially worked out and person has some intent to carry it out.   Have you started to work out or worked out the details of how to kill yourself either for while you were here in the hospital or for after you leave the hospital? Do you intend to carry out this plan?        6) Suicide Behavior    While you were here in the hospital, have you done anything, started to do anything, or prepared to do anything to end your life?    Examples: Took pills, cut yourself, tried to hang yourself, took out pills but didn't swallow  "any because you changed your mind or someone took them from you, collected pills, secured a means of obtaining a gun, gave away valuables, wrote a will or suicide note, etc.  X     Suicidal: Absent; patient stated that she self-harmed earlier today with scissors. Patient denied this as a suicide attempt. Patient stated that she wouldn't kill herself stating \"it's not my job. Only God can decide when someone dies.\"  Previous Attempts: Patient denied a history of suicide attempts.  Most Recent Attempt: N/A    Psychosocial History  Highest Level of Education: Patient did not disclose.  Family Hx of Mental Health/Substance Abuse: No  Patient Trauma/Abuse History: Patient denied a history of trauma and abuse.    Does this require reporting: No  Patient Identified Support System (List family members, loved ones, guardians, friends, etc): Patient stated that she has family but they moved out of state.     Legal History / History of Violence: None known   Experience with Interpersonal Violence: No  History of Inappropriate Sexual Behavior: No  Current Medical Conditions or Biomedical Complications: Yes; anxiety and depression.    Social Determinants of Health  Housing Instability and/or Utility Needs: No  Food Insecurity: No  Transportation Needs: No    Substance Use History  Active Use: Patient denied substance use. No UDS in chart from this encounter.     PLAN:  Clinician and patient had a discussion regarding referral to [facility] due to patient stating that she is scared she will have another panic attack and won't be able to sleep at home. At this time, patient is not agreeable to inpatient referrals stating \"I'm depressed, not crazy.\" Patient and clinician discussed outpatient treatment. Patient is agreeable to exploring therapy options, but stated that she doesn't believe she needs to see a psychiatrist. Patient is agreeable to following up with her PCP. Patient is also agreeable to taking outpatient resources " home with her. Clinician collaborated with the treatment team who agree to adopt the recommendations. Clinician discussed recommendations with patient and/or patient support systems, and patient is agreeable to the plan.    Safety Planning:  Safety Plan: Clinician verbally engaged in safety planning by assisting the patient in identifying risk factors that would indicate the need for higher level of care, such as thoughts to harm self or others and/or self-harming behavior(s). Safety plan of report to nearest hospital, calling local police or 911 if feeling unsafe, if having suicidal or homicidal thoughts, or if in emergent need of medications verbally reviewed with patient during assessment and suicide prevention/crisis hotlines verbally reviewed with patient during assessment. Patient during assessment verbally agreed to safety plan. Reviewed resources of crisis hotlines or presenting to the nearest emergency department should symptoms worsen, or in any crisis/emergency. Patient agreeable and voiced understanding.     SIGNATURE  MELINA Byers  05/16/2024

## 2024-05-27 LAB
QT INTERVAL: 354 MS
QTC INTERVAL: 415 MS

## (undated) DEVICE — INCISOR PLUS PLATINUM 4.5 MM BLADE: Brand: DYONICS INCISOR

## (undated) DEVICE — DRSNG SURG AQUACEL AG/ADVNTGE 9X15CM 3.5X6IN

## (undated) DEVICE — PK ARTHSCP SHLDR 10

## (undated) DEVICE — BLANKT WARM LOWR/BDY 100X120CM

## (undated) DEVICE — GLV SURG SENSICARE PI MIC PF SZ7 LF STRL

## (undated) DEVICE — SUT ETHLN 3/0 PC5 18IN 1893G

## (undated) DEVICE — GLV SURG PREMIERPRO MIC LTX PF SZ7 BRN

## (undated) DEVICE — POSTN ARM CRDL LAMIN PK/2

## (undated) DEVICE — SHEET, DRAPE, SPLIT, STERILE: Brand: MEDLINE

## (undated) DEVICE — DYONICS 25 INFLOW TUBE SET, 3 PER BOX

## (undated) DEVICE — GLV SURG PREMIERPRO GAMMEX NEOPRN PF SZ8 GRN

## (undated) DEVICE — SUPER TURBOVAC 90 IFS: Brand: COBLATION

## (undated) DEVICE — HDRST INTUB GENTLETOUCH SLOT 7IN RT

## (undated) DEVICE — DRAPE,U/ SHT,SPLIT,PLAS,STERIL: Brand: MEDLINE

## (undated) DEVICE — GLV SURG PREMIERPRO MIC LTX PF SZ8 BRN